# Patient Record
Sex: FEMALE | Race: WHITE | Employment: OTHER | URBAN - METROPOLITAN AREA
[De-identification: names, ages, dates, MRNs, and addresses within clinical notes are randomized per-mention and may not be internally consistent; named-entity substitution may affect disease eponyms.]

---

## 2017-02-27 ENCOUNTER — OFFICE VISIT (OUTPATIENT)
Dept: INTERNAL MEDICINE | Age: 82
End: 2017-02-27

## 2017-02-27 VITALS
TEMPERATURE: 97.3 F | RESPIRATION RATE: 12 BRPM | HEART RATE: 56 BPM | WEIGHT: 187 LBS | HEIGHT: 64 IN | OXYGEN SATURATION: 97 % | DIASTOLIC BLOOD PRESSURE: 100 MMHG | BODY MASS INDEX: 31.92 KG/M2 | SYSTOLIC BLOOD PRESSURE: 152 MMHG

## 2017-02-27 DIAGNOSIS — Z76.0 MEDICATION REFILL: ICD-10-CM

## 2017-02-27 DIAGNOSIS — F41.9 ANXIETY: ICD-10-CM

## 2017-02-27 DIAGNOSIS — R19.7 DIARRHEA, UNSPECIFIED TYPE: Primary | ICD-10-CM

## 2017-02-27 DIAGNOSIS — E03.9 ACQUIRED HYPOTHYROIDISM: ICD-10-CM

## 2017-02-27 DIAGNOSIS — I10 ESSENTIAL HYPERTENSION: ICD-10-CM

## 2017-02-27 DIAGNOSIS — M47.814 OSTEOARTHRITIS OF THORACIC SPINE, UNSPECIFIED SPINAL OSTEOARTHRITIS COMPLICATION STATUS: ICD-10-CM

## 2017-02-27 PROCEDURE — 99214 OFFICE O/P EST MOD 30 MIN: CPT | Performed by: FAMILY MEDICINE

## 2017-02-27 RX ORDER — METOPROLOL TARTRATE 100 MG/1
100 TABLET ORAL 2 TIMES DAILY
Qty: 180 TABLET | Refills: 3 | Status: ON HOLD | OUTPATIENT
Start: 2017-02-27 | End: 2022-05-08

## 2017-02-27 RX ORDER — DICYCLOMINE HCL 20 MG
20 TABLET ORAL 3 TIMES DAILY PRN
Qty: 90 TABLET | Refills: 2 | Status: ON HOLD | OUTPATIENT
Start: 2017-02-27 | End: 2019-12-13

## 2017-02-27 RX ORDER — OMEGA-3 FATTY ACIDS/FISH OIL 300-1000MG
1000 CAPSULE ORAL
COMMUNITY

## 2017-02-27 RX ORDER — TRAMADOL HYDROCHLORIDE 50 MG/1
50 TABLET ORAL EVERY 12 HOURS
Qty: 60 TABLET | Refills: 0 | Status: SHIPPED | OUTPATIENT
Start: 2017-02-27 | End: 2017-03-09

## 2017-02-27 RX ORDER — VITAMIN E 268 MG
400 CAPSULE ORAL DAILY
COMMUNITY

## 2017-02-27 RX ORDER — ACETAMINOPHEN 325 MG/1
650 TABLET ORAL EVERY 6 HOURS PRN
Status: ON HOLD | COMMUNITY
End: 2022-05-09 | Stop reason: SDUPTHER

## 2017-02-27 ASSESSMENT — PATIENT HEALTH QUESTIONNAIRE - PHQ9
SUM OF ALL RESPONSES TO PHQ9 QUESTIONS 1 & 2: 0
SUM OF ALL RESPONSES TO PHQ QUESTIONS 1-9: 0
1. LITTLE INTEREST OR PLEASURE IN DOING THINGS: 0
2. FEELING DOWN, DEPRESSED OR HOPELESS: 0

## 2019-12-13 ENCOUNTER — APPOINTMENT (OUTPATIENT)
Dept: GENERAL RADIOLOGY | Age: 84
DRG: 247 | End: 2019-12-13
Payer: MEDICARE

## 2019-12-13 ENCOUNTER — APPOINTMENT (OUTPATIENT)
Dept: CARDIAC CATH/INVASIVE PROCEDURES | Age: 84
DRG: 247 | End: 2019-12-13
Payer: MEDICARE

## 2019-12-13 ENCOUNTER — HOSPITAL ENCOUNTER (INPATIENT)
Age: 84
LOS: 1 days | Discharge: HOME | DRG: 247 | End: 2019-12-14
Attending: EMERGENCY MEDICINE | Admitting: INTERNAL MEDICINE
Payer: MEDICARE

## 2019-12-13 DIAGNOSIS — E87.6 HYPOKALEMIA: ICD-10-CM

## 2019-12-13 DIAGNOSIS — R07.9 CHEST PAIN, UNSPECIFIED TYPE: Primary | ICD-10-CM

## 2019-12-13 PROBLEM — I20.8 ANGINA AT REST (HCC): Status: ACTIVE | Noted: 2019-12-13

## 2019-12-13 PROBLEM — I21.4 NSTEMI (NON-ST ELEVATED MYOCARDIAL INFARCTION) (HCC): Status: ACTIVE | Noted: 2019-12-13

## 2019-12-13 LAB
ALBUMIN SERPL-MCNC: 4.1 G/DL (ref 3.5–4.6)
ALP BLD-CCNC: 68 U/L (ref 40–130)
ALT SERPL-CCNC: 20 U/L (ref 0–33)
ANION GAP SERPL CALCULATED.3IONS-SCNC: 11 MEQ/L (ref 9–15)
AST SERPL-CCNC: 28 U/L (ref 0–35)
BASOPHILS ABSOLUTE: 0 K/UL (ref 0–0.2)
BASOPHILS RELATIVE PERCENT: 0.2 %
BILIRUB SERPL-MCNC: 0.7 MG/DL (ref 0.2–0.7)
BUN BLDV-MCNC: 17 MG/DL (ref 8–23)
CALCIUM SERPL-MCNC: 9.8 MG/DL (ref 8.5–9.9)
CHLORIDE BLD-SCNC: 102 MEQ/L (ref 95–107)
CO2: 30 MEQ/L (ref 20–31)
CREAT SERPL-MCNC: 0.82 MG/DL (ref 0.5–0.9)
EOSINOPHILS ABSOLUTE: 0.2 K/UL (ref 0–0.7)
EOSINOPHILS RELATIVE PERCENT: 3.7 %
GFR AFRICAN AMERICAN: >60
GFR NON-AFRICAN AMERICAN: >60
GLOBULIN: 3.4 G/DL (ref 2.3–3.5)
GLUCOSE BLD-MCNC: 107 MG/DL (ref 70–99)
GLUCOSE BLD-MCNC: 142 MG/DL (ref 60–115)
HCT VFR BLD CALC: 40.9 % (ref 37–47)
HEMOGLOBIN: 14.1 G/DL (ref 12–16)
LV EF: 60 %
LVEF MODALITY: NORMAL
LYMPHOCYTES ABSOLUTE: 1.4 K/UL (ref 1–4.8)
LYMPHOCYTES RELATIVE PERCENT: 26.1 %
MCH RBC QN AUTO: 32.3 PG (ref 27–31.3)
MCHC RBC AUTO-ENTMCNC: 34.4 % (ref 33–37)
MCV RBC AUTO: 94.1 FL (ref 82–100)
MONOCYTES ABSOLUTE: 0.7 K/UL (ref 0.2–0.8)
MONOCYTES RELATIVE PERCENT: 13.6 %
NEUTROPHILS ABSOLUTE: 3.1 K/UL (ref 1.4–6.5)
NEUTROPHILS RELATIVE PERCENT: 56.4 %
PDW BLD-RTO: 13 % (ref 11.5–14.5)
PERFORMED ON: ABNORMAL
PLATELET # BLD: 126 K/UL (ref 130–400)
POTASSIUM SERPL-SCNC: 3.2 MEQ/L (ref 3.4–4.9)
PRO-BNP: 342 PG/ML
RBC # BLD: 4.35 M/UL (ref 4.2–5.4)
SODIUM BLD-SCNC: 143 MEQ/L (ref 135–144)
TOTAL PROTEIN: 7.5 G/DL (ref 6.3–8)
TROPONIN: 0.15 NG/ML (ref 0–0.01)
TROPONIN: 0.19 NG/ML (ref 0–0.01)
TROPONIN: <0.01 NG/ML (ref 0–0.01)
TSH SERPL DL<=0.05 MIU/L-ACNC: 1.76 UIU/ML (ref 0.44–3.86)
WBC # BLD: 5.5 K/UL (ref 4.8–10.8)

## 2019-12-13 PROCEDURE — 6360000002 HC RX W HCPCS: Performed by: PERSONAL EMERGENCY RESPONSE ATTENDANT

## 2019-12-13 PROCEDURE — 96374 THER/PROPH/DIAG INJ IV PUSH: CPT

## 2019-12-13 PROCEDURE — 6370000000 HC RX 637 (ALT 250 FOR IP): Performed by: PHYSICIAN ASSISTANT

## 2019-12-13 PROCEDURE — 99285 EMERGENCY DEPT VISIT HI MDM: CPT

## 2019-12-13 PROCEDURE — 2500000003 HC RX 250 WO HCPCS

## 2019-12-13 PROCEDURE — 93458 L HRT ARTERY/VENTRICLE ANGIO: CPT | Performed by: INTERNAL MEDICINE

## 2019-12-13 PROCEDURE — 2060000000 HC ICU INTERMEDIATE R&B

## 2019-12-13 PROCEDURE — 83880 ASSAY OF NATRIURETIC PEPTIDE: CPT

## 2019-12-13 PROCEDURE — C9600 PERC DRUG-EL COR STENT SING: HCPCS | Performed by: INTERNAL MEDICINE

## 2019-12-13 PROCEDURE — 93005 ELECTROCARDIOGRAM TRACING: CPT | Performed by: EMERGENCY MEDICINE

## 2019-12-13 PROCEDURE — 84443 ASSAY THYROID STIM HORMONE: CPT

## 2019-12-13 PROCEDURE — 74022 RADEX COMPL AQT ABD SERIES: CPT

## 2019-12-13 PROCEDURE — B2111ZZ FLUOROSCOPY OF MULTIPLE CORONARY ARTERIES USING LOW OSMOLAR CONTRAST: ICD-10-PCS | Performed by: INTERNAL MEDICINE

## 2019-12-13 PROCEDURE — 93010 ELECTROCARDIOGRAM REPORT: CPT | Performed by: INTERNAL MEDICINE

## 2019-12-13 PROCEDURE — 6360000004 HC RX CONTRAST MEDICATION: Performed by: INTERNAL MEDICINE

## 2019-12-13 PROCEDURE — 93005 ELECTROCARDIOGRAM TRACING: CPT | Performed by: INTERNAL MEDICINE

## 2019-12-13 PROCEDURE — 2580000003 HC RX 258: Performed by: INTERNAL MEDICINE

## 2019-12-13 PROCEDURE — 84484 ASSAY OF TROPONIN QUANT: CPT

## 2019-12-13 PROCEDURE — B2151ZZ FLUOROSCOPY OF LEFT HEART USING LOW OSMOLAR CONTRAST: ICD-10-PCS | Performed by: INTERNAL MEDICINE

## 2019-12-13 PROCEDURE — 80053 COMPREHEN METABOLIC PANEL: CPT

## 2019-12-13 PROCEDURE — C1894 INTRO/SHEATH, NON-LASER: HCPCS

## 2019-12-13 PROCEDURE — 93306 TTE W/DOPPLER COMPLETE: CPT

## 2019-12-13 PROCEDURE — 85025 COMPLETE CBC W/AUTO DIFF WBC: CPT

## 2019-12-13 PROCEDURE — 4A023N7 MEASUREMENT OF CARDIAC SAMPLING AND PRESSURE, LEFT HEART, PERCUTANEOUS APPROACH: ICD-10-PCS | Performed by: INTERNAL MEDICINE

## 2019-12-13 PROCEDURE — 027036Z DILATION OF CORONARY ARTERY, ONE ARTERY WITH THREE DRUG-ELUTING INTRALUMINAL DEVICES, PERCUTANEOUS APPROACH: ICD-10-PCS | Performed by: INTERNAL MEDICINE

## 2019-12-13 PROCEDURE — 6370000000 HC RX 637 (ALT 250 FOR IP): Performed by: INTERNAL MEDICINE

## 2019-12-13 PROCEDURE — 6360000002 HC RX W HCPCS

## 2019-12-13 PROCEDURE — APPNB45 APP NON BILLABLE 31-45 MINUTES: Performed by: PHYSICIAN ASSISTANT

## 2019-12-13 PROCEDURE — 6370000000 HC RX 637 (ALT 250 FOR IP): Performed by: PERSONAL EMERGENCY RESPONSE ATTENDANT

## 2019-12-13 PROCEDURE — 2580000003 HC RX 258

## 2019-12-13 PROCEDURE — 96375 TX/PRO/DX INJ NEW DRUG ADDON: CPT

## 2019-12-13 PROCEDURE — 2580000003 HC RX 258: Performed by: PHYSICIAN ASSISTANT

## 2019-12-13 PROCEDURE — 36415 COLL VENOUS BLD VENIPUNCTURE: CPT

## 2019-12-13 PROCEDURE — 2709999900 HC NON-CHARGEABLE SUPPLY

## 2019-12-13 PROCEDURE — 92928 PRQ TCAT PLMT NTRAC ST 1 LES: CPT | Performed by: INTERNAL MEDICINE

## 2019-12-13 PROCEDURE — 6370000000 HC RX 637 (ALT 250 FOR IP)

## 2019-12-13 PROCEDURE — C1887 CATHETER, GUIDING: HCPCS

## 2019-12-13 PROCEDURE — C1725 CATH, TRANSLUMIN NON-LASER: HCPCS

## 2019-12-13 PROCEDURE — C1769 GUIDE WIRE: HCPCS

## 2019-12-13 PROCEDURE — C1874 STENT, COATED/COV W/DEL SYS: HCPCS

## 2019-12-13 PROCEDURE — 6360000002 HC RX W HCPCS: Performed by: INTERNAL MEDICINE

## 2019-12-13 RX ORDER — ONDANSETRON 2 MG/ML
4 INJECTION INTRAMUSCULAR; INTRAVENOUS ONCE
Status: COMPLETED | OUTPATIENT
Start: 2019-12-13 | End: 2019-12-13

## 2019-12-13 RX ORDER — SODIUM CHLORIDE 0.9 % (FLUSH) 0.9 %
10 SYRINGE (ML) INJECTION PRN
Status: DISCONTINUED | OUTPATIENT
Start: 2019-12-13 | End: 2019-12-14 | Stop reason: HOSPADM

## 2019-12-13 RX ORDER — MORPHINE SULFATE 2 MG/ML
2 INJECTION, SOLUTION INTRAMUSCULAR; INTRAVENOUS ONCE
Status: COMPLETED | OUTPATIENT
Start: 2019-12-13 | End: 2019-12-13

## 2019-12-13 RX ORDER — ATORVASTATIN CALCIUM 40 MG/1
40 TABLET, FILM COATED ORAL NIGHTLY
Status: DISCONTINUED | OUTPATIENT
Start: 2019-12-13 | End: 2019-12-14 | Stop reason: HOSPADM

## 2019-12-13 RX ORDER — LOSARTAN POTASSIUM 25 MG/1
25 TABLET ORAL DAILY
Status: DISCONTINUED | OUTPATIENT
Start: 2019-12-13 | End: 2019-12-14 | Stop reason: HOSPADM

## 2019-12-13 RX ORDER — LABETALOL 20 MG/4 ML (5 MG/ML) INTRAVENOUS SYRINGE
10 EVERY 30 MIN PRN
Status: DISCONTINUED | OUTPATIENT
Start: 2019-12-13 | End: 2019-12-14 | Stop reason: HOSPADM

## 2019-12-13 RX ORDER — POTASSIUM CHLORIDE 20 MEQ/1
20 TABLET, EXTENDED RELEASE ORAL ONCE
Status: COMPLETED | OUTPATIENT
Start: 2019-12-13 | End: 2019-12-13

## 2019-12-13 RX ORDER — SODIUM CHLORIDE 0.9 % (FLUSH) 0.9 %
10 SYRINGE (ML) INJECTION EVERY 12 HOURS SCHEDULED
Status: DISCONTINUED | OUTPATIENT
Start: 2019-12-13 | End: 2019-12-14 | Stop reason: HOSPADM

## 2019-12-13 RX ORDER — HYDRALAZINE HYDROCHLORIDE 20 MG/ML
10 INJECTION INTRAMUSCULAR; INTRAVENOUS EVERY 10 MIN PRN
Status: DISCONTINUED | OUTPATIENT
Start: 2019-12-13 | End: 2019-12-14 | Stop reason: HOSPADM

## 2019-12-13 RX ORDER — ONDANSETRON 2 MG/ML
4 INJECTION INTRAMUSCULAR; INTRAVENOUS EVERY 6 HOURS PRN
Status: DISCONTINUED | OUTPATIENT
Start: 2019-12-13 | End: 2019-12-13 | Stop reason: SDUPTHER

## 2019-12-13 RX ORDER — ACETAMINOPHEN 325 MG/1
650 TABLET ORAL EVERY 6 HOURS PRN
Status: DISCONTINUED | OUTPATIENT
Start: 2019-12-13 | End: 2019-12-14 | Stop reason: HOSPADM

## 2019-12-13 RX ORDER — CARVEDILOL 3.12 MG/1
3.12 TABLET ORAL 2 TIMES DAILY
Status: DISCONTINUED | OUTPATIENT
Start: 2019-12-13 | End: 2019-12-14 | Stop reason: HOSPADM

## 2019-12-13 RX ORDER — POTASSIUM CHLORIDE 20 MEQ/1
40 TABLET, EXTENDED RELEASE ORAL ONCE
Status: DISCONTINUED | OUTPATIENT
Start: 2019-12-13 | End: 2019-12-13

## 2019-12-13 RX ORDER — SODIUM CHLORIDE 9 MG/ML
INJECTION, SOLUTION INTRAVENOUS CONTINUOUS
Status: DISCONTINUED | OUTPATIENT
Start: 2019-12-13 | End: 2019-12-14 | Stop reason: HOSPADM

## 2019-12-13 RX ORDER — ASPIRIN 81 MG/1
81 TABLET, CHEWABLE ORAL DAILY
Status: DISCONTINUED | OUTPATIENT
Start: 2019-12-14 | End: 2019-12-14 | Stop reason: HOSPADM

## 2019-12-13 RX ORDER — ACETAMINOPHEN 325 MG/1
650 TABLET ORAL EVERY 4 HOURS PRN
Status: DISCONTINUED | OUTPATIENT
Start: 2019-12-13 | End: 2019-12-14 | Stop reason: HOSPADM

## 2019-12-13 RX ORDER — ONDANSETRON 2 MG/ML
4 INJECTION INTRAMUSCULAR; INTRAVENOUS EVERY 6 HOURS PRN
Status: DISCONTINUED | OUTPATIENT
Start: 2019-12-13 | End: 2019-12-14 | Stop reason: HOSPADM

## 2019-12-13 RX ORDER — VITAMIN E 268 MG
400 CAPSULE ORAL DAILY
Status: DISCONTINUED | OUTPATIENT
Start: 2019-12-13 | End: 2019-12-14 | Stop reason: HOSPADM

## 2019-12-13 RX ORDER — SODIUM CHLORIDE 9 MG/ML
INJECTION, SOLUTION INTRAVENOUS CONTINUOUS
Status: ACTIVE | OUTPATIENT
Start: 2019-12-13 | End: 2019-12-14

## 2019-12-13 RX ADMIN — CARVEDILOL 3.12 MG: 3.12 TABLET, FILM COATED ORAL at 20:14

## 2019-12-13 RX ADMIN — Medication 10 ML: at 20:15

## 2019-12-13 RX ADMIN — MORPHINE SULFATE 2 MG: 2 INJECTION, SOLUTION INTRAMUSCULAR; INTRAVENOUS at 05:31

## 2019-12-13 RX ADMIN — ENOXAPARIN SODIUM 40 MG: 40 INJECTION SUBCUTANEOUS at 20:14

## 2019-12-13 RX ADMIN — LOSARTAN POTASSIUM 25 MG: 25 TABLET ORAL at 12:14

## 2019-12-13 RX ADMIN — SODIUM CHLORIDE: 9 INJECTION, SOLUTION INTRAVENOUS at 22:35

## 2019-12-13 RX ADMIN — SODIUM CHLORIDE: 9 INJECTION, SOLUTION INTRAVENOUS at 13:31

## 2019-12-13 RX ADMIN — POTASSIUM CHLORIDE 20 MEQ: 20 TABLET, EXTENDED RELEASE ORAL at 05:39

## 2019-12-13 RX ADMIN — IOPAMIDOL 180 ML: 612 INJECTION, SOLUTION INTRAVENOUS at 14:30

## 2019-12-13 RX ADMIN — LIDOCAINE HYDROCHLORIDE: 20 SOLUTION ORAL; TOPICAL at 05:05

## 2019-12-13 RX ADMIN — TICAGRELOR 90 MG: 90 TABLET ORAL at 20:14

## 2019-12-13 RX ADMIN — ONDANSETRON 4 MG: 2 INJECTION INTRAMUSCULAR; INTRAVENOUS at 05:30

## 2019-12-13 RX ADMIN — ATORVASTATIN CALCIUM 40 MG: 40 TABLET, FILM COATED ORAL at 20:14

## 2019-12-13 RX ADMIN — Medication 10 ML: at 10:46

## 2019-12-13 ASSESSMENT — ENCOUNTER SYMPTOMS
VOMITING: 0
DIARRHEA: 0
COLOR CHANGE: 0
SHORTNESS OF BREATH: 0
NAUSEA: 1
SORE THROAT: 0
ABDOMINAL PAIN: 1
BLOOD IN STOOL: 0
CHEST TIGHTNESS: 1
RHINORRHEA: 0
BACK PAIN: 1
COUGH: 0

## 2019-12-13 ASSESSMENT — PAIN DESCRIPTION - FREQUENCY: FREQUENCY: CONTINUOUS

## 2019-12-13 ASSESSMENT — HEART SCORE: ECG: 0

## 2019-12-13 ASSESSMENT — PAIN DESCRIPTION - DESCRIPTORS: DESCRIPTORS: ACHING

## 2019-12-13 ASSESSMENT — PAIN SCALES - GENERAL
PAINLEVEL_OUTOF10: 0
PAINLEVEL_OUTOF10: 0
PAINLEVEL_OUTOF10: 10
PAINLEVEL_OUTOF10: 8
PAINLEVEL_OUTOF10: 0

## 2019-12-13 ASSESSMENT — PAIN DESCRIPTION - PAIN TYPE: TYPE: ACUTE PAIN

## 2019-12-13 ASSESSMENT — PAIN DESCRIPTION - LOCATION: LOCATION: CHEST

## 2019-12-13 ASSESSMENT — PAIN DESCRIPTION - ONSET: ONSET: PROGRESSIVE

## 2019-12-13 ASSESSMENT — PAIN DESCRIPTION - PROGRESSION: CLINICAL_PROGRESSION: RAPIDLY IMPROVING

## 2019-12-14 VITALS
RESPIRATION RATE: 17 BRPM | OXYGEN SATURATION: 99 % | BODY MASS INDEX: 31.65 KG/M2 | SYSTOLIC BLOOD PRESSURE: 155 MMHG | WEIGHT: 190 LBS | HEIGHT: 65 IN | DIASTOLIC BLOOD PRESSURE: 64 MMHG | TEMPERATURE: 97.3 F | HEART RATE: 55 BPM

## 2019-12-14 LAB
ANION GAP SERPL CALCULATED.3IONS-SCNC: 12 MEQ/L (ref 9–15)
BUN BLDV-MCNC: 10 MG/DL (ref 8–23)
CALCIUM SERPL-MCNC: 9.1 MG/DL (ref 8.5–9.9)
CHLORIDE BLD-SCNC: 106 MEQ/L (ref 95–107)
CO2: 25 MEQ/L (ref 20–31)
CREAT SERPL-MCNC: 0.66 MG/DL (ref 0.5–0.9)
EKG ATRIAL RATE: 57 BPM
EKG ATRIAL RATE: 57 BPM
EKG P AXIS: 36 DEGREES
EKG P-R INTERVAL: 146 MS
EKG Q-T INTERVAL: 492 MS
EKG Q-T INTERVAL: 532 MS
EKG QRS DURATION: 110 MS
EKG QRS DURATION: 110 MS
EKG QTC CALCULATION (BAZETT): 478 MS
EKG QTC CALCULATION (BAZETT): 522 MS
EKG R AXIS: -2 DEGREES
EKG R AXIS: -3 DEGREES
EKG T AXIS: 45 DEGREES
EKG T AXIS: 81 DEGREES
EKG VENTRICULAR RATE: 57 BPM
EKG VENTRICULAR RATE: 58 BPM
GFR AFRICAN AMERICAN: >60
GFR NON-AFRICAN AMERICAN: >60
GLUCOSE BLD-MCNC: 101 MG/DL (ref 70–99)
HCT VFR BLD CALC: 37.8 % (ref 37–47)
HEMOGLOBIN: 13 G/DL (ref 12–16)
MCH RBC QN AUTO: 32.9 PG (ref 27–31.3)
MCHC RBC AUTO-ENTMCNC: 34.3 % (ref 33–37)
MCV RBC AUTO: 95.9 FL (ref 82–100)
PDW BLD-RTO: 12.9 % (ref 11.5–14.5)
PLATELET # BLD: 127 K/UL (ref 130–400)
POTASSIUM SERPL-SCNC: 3.6 MEQ/L (ref 3.4–4.9)
RBC # BLD: 3.94 M/UL (ref 4.2–5.4)
SODIUM BLD-SCNC: 143 MEQ/L (ref 135–144)
WBC # BLD: 5.3 K/UL (ref 4.8–10.8)

## 2019-12-14 PROCEDURE — 93005 ELECTROCARDIOGRAM TRACING: CPT | Performed by: INTERNAL MEDICINE

## 2019-12-14 PROCEDURE — 99238 HOSP IP/OBS DSCHRG MGMT 30/<: CPT | Performed by: INTERNAL MEDICINE

## 2019-12-14 PROCEDURE — 6360000002 HC RX W HCPCS: Performed by: INTERNAL MEDICINE

## 2019-12-14 PROCEDURE — 6370000000 HC RX 637 (ALT 250 FOR IP): Performed by: INTERNAL MEDICINE

## 2019-12-14 PROCEDURE — 85027 COMPLETE CBC AUTOMATED: CPT

## 2019-12-14 PROCEDURE — 6370000000 HC RX 637 (ALT 250 FOR IP): Performed by: PHYSICIAN ASSISTANT

## 2019-12-14 PROCEDURE — 90686 IIV4 VACC NO PRSV 0.5 ML IM: CPT | Performed by: INTERNAL MEDICINE

## 2019-12-14 PROCEDURE — G0008 ADMIN INFLUENZA VIRUS VAC: HCPCS | Performed by: INTERNAL MEDICINE

## 2019-12-14 PROCEDURE — 80048 BASIC METABOLIC PNL TOTAL CA: CPT

## 2019-12-14 PROCEDURE — 36415 COLL VENOUS BLD VENIPUNCTURE: CPT

## 2019-12-14 RX ORDER — CARVEDILOL 3.12 MG/1
3.12 TABLET ORAL 2 TIMES DAILY
Qty: 60 TABLET | Refills: 3 | Status: CANCELLED | OUTPATIENT
Start: 2019-12-14

## 2019-12-14 RX ORDER — LOSARTAN POTASSIUM 25 MG/1
25 TABLET ORAL DAILY
Qty: 30 TABLET | Refills: 3 | Status: ON HOLD | OUTPATIENT
Start: 2019-12-14 | End: 2022-05-08

## 2019-12-14 RX ORDER — ATORVASTATIN CALCIUM 40 MG/1
40 TABLET, FILM COATED ORAL NIGHTLY
Qty: 30 TABLET | Refills: 3 | Status: SHIPPED | OUTPATIENT
Start: 2019-12-14 | End: 2019-12-14 | Stop reason: HOSPADM

## 2019-12-14 RX ORDER — ATORVASTATIN CALCIUM 20 MG/1
20 TABLET, FILM COATED ORAL NIGHTLY
Refills: 3 | COMMUNITY
Start: 2019-12-08

## 2019-12-14 RX ORDER — MECLIZINE HYDROCHLORIDE 25 MG/1
25 TABLET ORAL 3 TIMES DAILY PRN
Refills: 2 | COMMUNITY
Start: 2019-11-22

## 2019-12-14 RX ORDER — OXYMETAZOLINE HYDROCHLORIDE 0.05 G/100ML
2 SPRAY NASAL 2 TIMES DAILY PRN
Status: DISCONTINUED | OUTPATIENT
Start: 2019-12-14 | End: 2019-12-14 | Stop reason: HOSPADM

## 2019-12-14 RX ORDER — HYDROCHLOROTHIAZIDE 25 MG/1
25 TABLET ORAL DAILY
Refills: 3 | COMMUNITY
Start: 2019-10-29

## 2019-12-14 RX ORDER — BUSPIRONE HYDROCHLORIDE 10 MG/1
10 TABLET ORAL 2 TIMES DAILY
Refills: 3 | Status: ON HOLD | COMMUNITY
Start: 2019-10-29 | End: 2022-05-08

## 2019-12-14 RX ORDER — MORPHINE SULFATE 2 MG/ML
2 INJECTION, SOLUTION INTRAMUSCULAR; INTRAVENOUS ONCE
Status: COMPLETED | OUTPATIENT
Start: 2019-12-14 | End: 2019-12-14

## 2019-12-14 RX ORDER — ZOLPIDEM TARTRATE 5 MG/1
5 TABLET ORAL NIGHTLY PRN
Refills: 2 | COMMUNITY
Start: 2019-12-10

## 2019-12-14 RX ADMIN — ASPIRIN 81 MG 81 MG: 81 TABLET ORAL at 08:10

## 2019-12-14 RX ADMIN — MORPHINE SULFATE 2 MG: 2 INJECTION, SOLUTION INTRAMUSCULAR; INTRAVENOUS at 03:30

## 2019-12-14 RX ADMIN — CARVEDILOL 3.12 MG: 3.12 TABLET, FILM COATED ORAL at 08:10

## 2019-12-14 RX ADMIN — TICAGRELOR 90 MG: 90 TABLET ORAL at 08:10

## 2019-12-14 RX ADMIN — ACETAMINOPHEN 650 MG: 325 TABLET ORAL at 00:51

## 2019-12-14 RX ADMIN — INFLUENZA A VIRUS A/BRISBANE/02/2018 IVR-190 (H1N1) ANTIGEN (PROPIOLACTONE INACTIVATED), INFLUENZA A VIRUS A/KANSAS/14/2017 X-327 (H3N2) ANTIGEN (PROPIOLACTONE INACTIVATED), INFLUENZA B VIRUS B/MARYLAND/15/2016 ANTIGEN (PROPIOLACTONE INACTIVATED), INFLUENZA B VIRUS B/PHUKET/3073/2013 BVR-1B ANTIGEN (PROPIOLACTONE INACTIVATED) 0.5 ML: 15; 15; 15; 15 INJECTION, SUSPENSION INTRAMUSCULAR at 11:07

## 2019-12-14 RX ADMIN — LOSARTAN POTASSIUM 25 MG: 25 TABLET ORAL at 08:10

## 2019-12-14 ASSESSMENT — PAIN SCALES - GENERAL
PAINLEVEL_OUTOF10: 0
PAINLEVEL_OUTOF10: 0
PAINLEVEL_OUTOF10: 7
PAINLEVEL_OUTOF10: 10
PAINLEVEL_OUTOF10: 0
PAINLEVEL_OUTOF10: 0

## 2019-12-14 ASSESSMENT — PAIN DESCRIPTION - DESCRIPTORS
DESCRIPTORS: DISCOMFORT;OTHER (COMMENT)
DESCRIPTORS: ACHING

## 2019-12-14 ASSESSMENT — PAIN DESCRIPTION - PROGRESSION: CLINICAL_PROGRESSION: NOT CHANGED

## 2019-12-14 ASSESSMENT — PAIN DESCRIPTION - PAIN TYPE
TYPE: ACUTE PAIN
TYPE: ACUTE PAIN

## 2019-12-14 ASSESSMENT — PAIN DESCRIPTION - ONSET: ONSET: ON-GOING

## 2019-12-14 ASSESSMENT — PAIN DESCRIPTION - LOCATION
LOCATION: GENERALIZED
LOCATION: GENERALIZED

## 2019-12-14 ASSESSMENT — PAIN DESCRIPTION - FREQUENCY
FREQUENCY: CONTINUOUS
FREQUENCY: CONTINUOUS

## 2019-12-16 PROCEDURE — 93010 ELECTROCARDIOGRAM REPORT: CPT | Performed by: INTERNAL MEDICINE

## 2022-05-07 ENCOUNTER — APPOINTMENT (OUTPATIENT)
Dept: GENERAL RADIOLOGY | Age: 87
DRG: 087 | End: 2022-05-07
Payer: MEDICARE

## 2022-05-07 ENCOUNTER — APPOINTMENT (OUTPATIENT)
Dept: CT IMAGING | Age: 87
DRG: 087 | End: 2022-05-07
Payer: MEDICARE

## 2022-05-07 ENCOUNTER — HOSPITAL ENCOUNTER (INPATIENT)
Age: 87
LOS: 2 days | Discharge: HOME | DRG: 087 | End: 2022-05-09
Attending: SURGERY | Admitting: SURGERY
Payer: MEDICARE

## 2022-05-07 DIAGNOSIS — G89.11 ACUTE PAIN DUE TO TRAUMA: ICD-10-CM

## 2022-05-07 DIAGNOSIS — S06.6X0A SUBARACHNOID HEMORRHAGE FOLLOWING INJURY, NO LOSS OF CONSCIOUSNESS, INITIAL ENCOUNTER (HCC): Primary | ICD-10-CM

## 2022-05-07 DIAGNOSIS — S02.2XXA CLOSED FRACTURE OF NASAL BONE, INITIAL ENCOUNTER: ICD-10-CM

## 2022-05-07 PROBLEM — I60.9 SAH (SUBARACHNOID HEMORRHAGE) (HCC): Status: ACTIVE | Noted: 2022-05-07

## 2022-05-07 LAB
ALBUMIN SERPL-MCNC: 4.3 G/DL (ref 3.5–4.6)
ALP BLD-CCNC: 73 U/L (ref 40–130)
ALT SERPL-CCNC: 14 U/L (ref 0–33)
ANION GAP SERPL CALCULATED.3IONS-SCNC: 11 MEQ/L (ref 9–15)
APTT: 31.7 SEC (ref 24.4–36.8)
AST SERPL-CCNC: 25 U/L (ref 0–35)
BASOPHILS ABSOLUTE: 0.1 K/UL (ref 0–0.2)
BASOPHILS RELATIVE PERCENT: 0.8 %
BILIRUB SERPL-MCNC: 0.5 MG/DL (ref 0.2–0.7)
BUN BLDV-MCNC: 17 MG/DL (ref 8–23)
CALCIUM SERPL-MCNC: 9.7 MG/DL (ref 8.5–9.9)
CHLORIDE BLD-SCNC: 104 MEQ/L (ref 95–107)
CO2: 26 MEQ/L (ref 20–31)
CREAT SERPL-MCNC: 1.08 MG/DL (ref 0.5–0.9)
EOSINOPHILS ABSOLUTE: 0.3 K/UL (ref 0–0.7)
EOSINOPHILS RELATIVE PERCENT: 4.4 %
ETHANOL PERCENT: NORMAL G/DL
ETHANOL: <10 MG/DL (ref 0–0.08)
GFR AFRICAN AMERICAN: 58
GFR NON-AFRICAN AMERICAN: 47.9
GLOBULIN: 2.8 G/DL (ref 2.3–3.5)
GLUCOSE BLD-MCNC: 115 MG/DL (ref 70–99)
HCT VFR BLD CALC: 36.9 % (ref 37–47)
HEMOGLOBIN: 12.4 G/DL (ref 12–16)
INR BLD: 1
LYMPHOCYTES ABSOLUTE: 1.4 K/UL (ref 1–4.8)
LYMPHOCYTES RELATIVE PERCENT: 20.4 %
MCH RBC QN AUTO: 32 PG (ref 27–31.3)
MCHC RBC AUTO-ENTMCNC: 33.6 % (ref 33–37)
MCV RBC AUTO: 95.4 FL (ref 82–100)
MONOCYTES ABSOLUTE: 0.9 K/UL (ref 0.2–0.8)
MONOCYTES RELATIVE PERCENT: 12.7 %
NEUTROPHILS ABSOLUTE: 4.2 K/UL (ref 1.4–6.5)
NEUTROPHILS RELATIVE PERCENT: 61.7 %
PDW BLD-RTO: 13.4 % (ref 11.5–14.5)
PLATELET # BLD: 129 K/UL (ref 130–400)
POTASSIUM SERPL-SCNC: 4.1 MEQ/L (ref 3.4–4.9)
PROTHROMBIN TIME: 13.5 SEC (ref 12.3–14.9)
RBC # BLD: 3.87 M/UL (ref 4.2–5.4)
SODIUM BLD-SCNC: 141 MEQ/L (ref 135–144)
TOTAL PROTEIN: 7.1 G/DL (ref 6.3–8)
WBC # BLD: 6.9 K/UL (ref 4.8–10.8)

## 2022-05-07 PROCEDURE — 70486 CT MAXILLOFACIAL W/O DYE: CPT

## 2022-05-07 PROCEDURE — 6370000000 HC RX 637 (ALT 250 FOR IP): Performed by: SURGERY

## 2022-05-07 PROCEDURE — 99223 1ST HOSP IP/OBS HIGH 75: CPT | Performed by: SURGERY

## 2022-05-07 PROCEDURE — 82077 ASSAY SPEC XCP UR&BREATH IA: CPT

## 2022-05-07 PROCEDURE — 96374 THER/PROPH/DIAG INJ IV PUSH: CPT

## 2022-05-07 PROCEDURE — 99221 1ST HOSP IP/OBS SF/LOW 40: CPT | Performed by: NEUROLOGICAL SURGERY

## 2022-05-07 PROCEDURE — 85610 PROTHROMBIN TIME: CPT

## 2022-05-07 PROCEDURE — 6360000002 HC RX W HCPCS: Performed by: PHYSICIAN ASSISTANT

## 2022-05-07 PROCEDURE — 80053 COMPREHEN METABOLIC PANEL: CPT

## 2022-05-07 PROCEDURE — 6360000002 HC RX W HCPCS: Performed by: SURGERY

## 2022-05-07 PROCEDURE — 36415 COLL VENOUS BLD VENIPUNCTURE: CPT

## 2022-05-07 PROCEDURE — 73130 X-RAY EXAM OF HAND: CPT

## 2022-05-07 PROCEDURE — 70450 CT HEAD/BRAIN W/O DYE: CPT

## 2022-05-07 PROCEDURE — 85025 COMPLETE CBC W/AUTO DIFF WBC: CPT

## 2022-05-07 PROCEDURE — 2000000000 HC ICU R&B

## 2022-05-07 PROCEDURE — 72128 CT CHEST SPINE W/O DYE: CPT

## 2022-05-07 PROCEDURE — 72125 CT NECK SPINE W/O DYE: CPT

## 2022-05-07 PROCEDURE — 99285 EMERGENCY DEPT VISIT HI MDM: CPT

## 2022-05-07 PROCEDURE — 2580000003 HC RX 258: Performed by: SURGERY

## 2022-05-07 PROCEDURE — 85730 THROMBOPLASTIN TIME PARTIAL: CPT

## 2022-05-07 RX ORDER — HYDROCHLOROTHIAZIDE 25 MG/1
25 TABLET ORAL DAILY
Status: DISCONTINUED | OUTPATIENT
Start: 2022-05-08 | End: 2022-05-09 | Stop reason: HOSPADM

## 2022-05-07 RX ORDER — OXYCODONE HYDROCHLORIDE 5 MG/1
5 TABLET ORAL ONCE
Status: DISCONTINUED | OUTPATIENT
Start: 2022-05-07 | End: 2022-05-07

## 2022-05-07 RX ORDER — MECLIZINE HYDROCHLORIDE 25 MG/1
25 TABLET ORAL 3 TIMES DAILY PRN
Status: DISCONTINUED | OUTPATIENT
Start: 2022-05-07 | End: 2022-05-09 | Stop reason: HOSPADM

## 2022-05-07 RX ORDER — SENNA AND DOCUSATE SODIUM 50; 8.6 MG/1; MG/1
1 TABLET, FILM COATED ORAL 2 TIMES DAILY
Status: DISCONTINUED | OUTPATIENT
Start: 2022-05-07 | End: 2022-05-09 | Stop reason: HOSPADM

## 2022-05-07 RX ORDER — MORPHINE SULFATE 2 MG/ML
2 INJECTION, SOLUTION INTRAMUSCULAR; INTRAVENOUS ONCE
Status: COMPLETED | OUTPATIENT
Start: 2022-05-07 | End: 2022-05-07

## 2022-05-07 RX ORDER — SODIUM CHLORIDE 9 MG/ML
INJECTION, SOLUTION INTRAVENOUS PRN
Status: DISCONTINUED | OUTPATIENT
Start: 2022-05-07 | End: 2022-05-09 | Stop reason: HOSPADM

## 2022-05-07 RX ORDER — BUSPIRONE HYDROCHLORIDE 10 MG/1
10 TABLET ORAL 2 TIMES DAILY
Status: DISCONTINUED | OUTPATIENT
Start: 2022-05-07 | End: 2022-05-09 | Stop reason: HOSPADM

## 2022-05-07 RX ORDER — SODIUM CHLORIDE 0.9 % (FLUSH) 0.9 %
5-40 SYRINGE (ML) INJECTION EVERY 12 HOURS SCHEDULED
Status: DISCONTINUED | OUTPATIENT
Start: 2022-05-07 | End: 2022-05-09 | Stop reason: HOSPADM

## 2022-05-07 RX ORDER — ONDANSETRON 4 MG/1
4 TABLET, ORALLY DISINTEGRATING ORAL EVERY 8 HOURS PRN
Status: DISCONTINUED | OUTPATIENT
Start: 2022-05-07 | End: 2022-05-09 | Stop reason: HOSPADM

## 2022-05-07 RX ORDER — ACETAMINOPHEN 325 MG/1
650 TABLET ORAL EVERY 6 HOURS
Status: DISCONTINUED | OUTPATIENT
Start: 2022-05-07 | End: 2022-05-09 | Stop reason: HOSPADM

## 2022-05-07 RX ORDER — ONDANSETRON 2 MG/ML
4 INJECTION INTRAMUSCULAR; INTRAVENOUS ONCE
Status: COMPLETED | OUTPATIENT
Start: 2022-05-07 | End: 2022-05-07

## 2022-05-07 RX ORDER — ATORVASTATIN CALCIUM 20 MG/1
20 TABLET, FILM COATED ORAL NIGHTLY
Status: DISCONTINUED | OUTPATIENT
Start: 2022-05-07 | End: 2022-05-09 | Stop reason: HOSPADM

## 2022-05-07 RX ORDER — ONDANSETRON 2 MG/ML
4 INJECTION INTRAMUSCULAR; INTRAVENOUS EVERY 6 HOURS PRN
Status: DISCONTINUED | OUTPATIENT
Start: 2022-05-07 | End: 2022-05-09 | Stop reason: HOSPADM

## 2022-05-07 RX ORDER — OXYCODONE HYDROCHLORIDE 5 MG/1
5 TABLET ORAL EVERY 4 HOURS PRN
Status: DISCONTINUED | OUTPATIENT
Start: 2022-05-07 | End: 2022-05-08

## 2022-05-07 RX ORDER — LOSARTAN POTASSIUM 25 MG/1
25 TABLET ORAL DAILY
Status: DISCONTINUED | OUTPATIENT
Start: 2022-05-08 | End: 2022-05-09 | Stop reason: HOSPADM

## 2022-05-07 RX ORDER — SODIUM CHLORIDE 0.9 % (FLUSH) 0.9 %
5-40 SYRINGE (ML) INJECTION PRN
Status: DISCONTINUED | OUTPATIENT
Start: 2022-05-07 | End: 2022-05-09 | Stop reason: HOSPADM

## 2022-05-07 RX ORDER — SODIUM CHLORIDE 9 MG/ML
INJECTION, SOLUTION INTRAVENOUS CONTINUOUS
Status: DISCONTINUED | OUTPATIENT
Start: 2022-05-07 | End: 2022-05-08

## 2022-05-07 RX ORDER — METOPROLOL TARTRATE 50 MG/1
100 TABLET, FILM COATED ORAL 2 TIMES DAILY
Status: DISCONTINUED | OUTPATIENT
Start: 2022-05-07 | End: 2022-05-09 | Stop reason: HOSPADM

## 2022-05-07 RX ORDER — OXYCODONE HYDROCHLORIDE 5 MG/1
10 TABLET ORAL EVERY 4 HOURS PRN
Status: DISCONTINUED | OUTPATIENT
Start: 2022-05-07 | End: 2022-05-08

## 2022-05-07 RX ORDER — HYDRALAZINE HYDROCHLORIDE 20 MG/ML
10 INJECTION INTRAMUSCULAR; INTRAVENOUS EVERY 4 HOURS PRN
Status: DISCONTINUED | OUTPATIENT
Start: 2022-05-07 | End: 2022-05-08

## 2022-05-07 RX ORDER — SODIUM PHOSPHATE, DIBASIC AND SODIUM PHOSPHATE, MONOBASIC 7; 19 G/133ML; G/133ML
1 ENEMA RECTAL DAILY PRN
Status: DISCONTINUED | OUTPATIENT
Start: 2022-05-07 | End: 2022-05-09 | Stop reason: HOSPADM

## 2022-05-07 RX ADMIN — METOPROLOL TARTRATE 100 MG: 50 TABLET, FILM COATED ORAL at 21:39

## 2022-05-07 RX ADMIN — LEVETIRACETAM 750 MG: 100 INJECTION, SOLUTION INTRAVENOUS at 21:49

## 2022-05-07 RX ADMIN — SODIUM CHLORIDE: 9 INJECTION, SOLUTION INTRAVENOUS at 21:48

## 2022-05-07 RX ADMIN — ATORVASTATIN CALCIUM 20 MG: 20 TABLET, FILM COATED ORAL at 21:40

## 2022-05-07 RX ADMIN — MORPHINE SULFATE 2 MG: 2 INJECTION, SOLUTION INTRAMUSCULAR; INTRAVENOUS at 19:54

## 2022-05-07 RX ADMIN — SODIUM CHLORIDE, PRESERVATIVE FREE 10 ML: 5 INJECTION INTRAVENOUS at 21:47

## 2022-05-07 RX ADMIN — BUSPIRONE HYDROCHLORIDE 10 MG: 10 TABLET ORAL at 21:40

## 2022-05-07 RX ADMIN — ONDANSETRON 4 MG: 2 INJECTION INTRAMUSCULAR; INTRAVENOUS at 18:55

## 2022-05-07 RX ADMIN — SENNOSIDES AND DOCUSATE SODIUM 1 TABLET: 50; 8.6 TABLET ORAL at 21:40

## 2022-05-07 RX ADMIN — ACETAMINOPHEN 650 MG: 325 TABLET ORAL at 21:39

## 2022-05-07 ASSESSMENT — PAIN SCALES - GENERAL
PAINLEVEL_OUTOF10: 7
PAINLEVEL_OUTOF10: 0
PAINLEVEL_OUTOF10: 8
PAINLEVEL_OUTOF10: 5
PAINLEVEL_OUTOF10: 0
PAINLEVEL_OUTOF10: 0
PAINLEVEL_OUTOF10: 5

## 2022-05-07 ASSESSMENT — PAIN DESCRIPTION - DESCRIPTORS
DESCRIPTORS: ACHING;DISCOMFORT
DESCRIPTORS: ACHING

## 2022-05-07 ASSESSMENT — PAIN DESCRIPTION - LOCATION
LOCATION: NECK;HEAD;FACE
LOCATION: HEAD

## 2022-05-07 ASSESSMENT — PAIN DESCRIPTION - FREQUENCY
FREQUENCY: CONTINUOUS
FREQUENCY: INTERMITTENT

## 2022-05-07 ASSESSMENT — ENCOUNTER SYMPTOMS
APNEA: 0
ABDOMINAL PAIN: 0
COLOR CHANGE: 0
TROUBLE SWALLOWING: 0
SHORTNESS OF BREATH: 0
ALLERGIC/IMMUNOLOGIC NEGATIVE: 1
EYE PAIN: 0

## 2022-05-07 ASSESSMENT — PAIN DESCRIPTION - PAIN TYPE
TYPE: ACUTE PAIN
TYPE: ACUTE PAIN

## 2022-05-07 ASSESSMENT — PAIN - FUNCTIONAL ASSESSMENT
PAIN_FUNCTIONAL_ASSESSMENT: PREVENTS OR INTERFERES SOME ACTIVE ACTIVITIES AND ADLS
PAIN_FUNCTIONAL_ASSESSMENT: PREVENTS OR INTERFERES SOME ACTIVE ACTIVITIES AND ADLS

## 2022-05-07 ASSESSMENT — PAIN DESCRIPTION - ONSET: ONSET: ON-GOING

## 2022-05-07 NOTE — ED PROVIDER NOTES
[address] [facility] ED  eMERGENCYdEPARTMENT eNCOUnter      Pt Name: Justo Sky  MRN: [id number]  [place] 1934  Date of evaluation: 5/7/2022  Provider:Braydon Walker PA-C    CHIEF COMPLAINT       Chief Complaint   Patient presents with    Fall     pt tripped over her dog and fell, denies LOC, c/o head and neck pain         HISTORY OF PRESENT ILLNESS  (Location/Symptom, Timing/Onset, Context/Setting, Quality, Duration, Modifying Factors, Severity.)   Justo Sky is a 80 y.o. female who presents to the emergency department following a mechanical fall at home prior to arrival.  Patient states that she had tripped over her dog, falling forward. Patient attempted to brace herself with her right hand and struck her head and face on the ground. She denies loss of consciousness. Patient states dizziness, headache and right hand pain as well as neck pain posteriorly. Patient denies any numbness or tingling. Patient denies any chest or abdominal pain. No change or loss of vision or hearing. HPI    Nursing Notes were reviewed and I agree. REVIEW OF SYSTEMS    (2-9 systems for level 4, 10 or more for level 5)     Review of Systems   Constitutional: Negative for diaphoresis and fever. HENT: Negative for hearing loss and trouble swallowing. Eyes: Negative for pain. Respiratory: Negative for apnea and shortness of breath. Cardiovascular: Negative for chest pain. Gastrointestinal: Negative for abdominal pain. Endocrine: Negative. Genitourinary: Negative for hematuria. Musculoskeletal: Positive for joint swelling. Negative for neck pain and neck stiffness. Skin: Positive for wound. Negative for color change. Allergic/Immunologic: Negative. Neurological: Positive for dizziness and headaches. Negative for numbness. Hematological: Negative. Psychiatric/Behavioral: Negative. All other systems reviewed and are negative.        Except as noted above the remainder of the review of systems was reviewed and negative. PAST MEDICAL HISTORY     Past Medical History:   Diagnosis Date    Anxiety     Cancer [facility])     colon s/p partial colectomy    GERD (gastroesophageal reflux disease)     Heart disease     Hyperlipidemia     Hypertension     Hypothyroidism     Obesity     Shortness of breath          SURGICAL HISTORY       Past Surgical History:   Procedure Laterality Date    BREAST SURGERY      COLECTOMY      partial for colon cancer    COLONOSCOPY      HYSTERECTOMY           CURRENT MEDICATIONS       Previous Medications    ACETAMINOPHEN (TYLENOL) 325 MG TABLET    Take 650 mg by mouth every 6 hours as needed for Pain    ASPIRIN (ECOTRIN) 325 MG EC TABLET    Take 81 mg by mouth daily    ASPIRIN 81 MG TABLET    Take 81 mg by mouth daily    ATORVASTATIN (LIPITOR) 20 MG TABLET    Take 20 mg by mouth nightly    BUSPIRONE (BUSPAR) 10 MG TABLET    Take 10 mg by mouth 2 times daily    HYDROCHLOROTHIAZIDE (HYDRODIURIL) 25 MG TABLET    Take 25 mg by mouth daily    LOSARTAN (COZAAR) 25 MG TABLET    Take 1 tablet by mouth daily    MECLIZINE (ANTIVERT) 25 MG TABLET    Take 25 mg by mouth 3 times daily as needed    METOPROLOL (LOPRESSOR) 100 MG TABLET    Take 1 tablet by mouth 2 times daily    OMEGA 3 1000 MG CAPS    Take 1,000 mg by mouth    TICAGRELOR (BRILINTA) 90 MG TABS TABLET    Take 1 tablet by mouth 2 times daily    VITAMIN E 400 UNIT CAPSULE    Take 400 Units by mouth daily    ZOLPIDEM (AMBIEN) 5 MG TABLET    Take 5 mg by mouth nightly as needed. ALLERGIES     Patient has no known allergies.     FAMILY HISTORY       Family History   Problem Relation Age of Onset    High Blood Pressure Mother     Other Mother         thyroid    Cancer Father     Heart Disease Brother           SOCIAL HISTORY       Social History     Socioeconomic History    Marital status:      Spouse name: None    Number of children: None    Years of education: None    Highest education level: None   Occupational History    None   Tobacco Use    Smoking status: Never Smoker    Smokeless tobacco: Never Used   Substance and Sexual Activity    Alcohol use: No    Drug use: No    Sexual activity: Never   Other Topics Concern    None   Social History Narrative    None     Social Determinants of Health     Financial Resource Strain:     Difficulty of Paying Living Expenses: Not on file   Food Insecurity:     Worried About Running Out of Food in the Last Year: Not on file    Jailyn of Food in the Last Year: Not on file   Transportation Needs:     Lack of Transportation (Medical): Not on file    Lack of Transportation (Non-Medical):  Not on file   Physical Activity:     Days of Exercise per Week: Not on file    Minutes of Exercise per Session: Not on file   Stress:     Feeling of Stress : Not on file   Social Connections:     Frequency of Communication with Friends and Family: Not on file    Frequency of Social Gatherings with Friends and Family: Not on file    Attends Zoroastrian Services: Not on file    Active Member of [address] [place] [address] or Organizations: Not on file    Attends Club or Organization Meetings: Not on file    Marital Status: Not on file   Intimate Partner Violence:     Fear of Current or Ex-Partner: Not on file    Emotionally Abused: Not on file    Physically Abused: Not on file    Sexually Abused: Not on file   Housing Stability:     Unable to Pay for Housing in the Last Year: Not on file    Number of [place] in the Last Year: Not on file    Unstable Housing in the Last Year: Not on file       SCREENINGS    [place] Coma Scale  Eye Opening: Spontaneous  Best Verbal Response: Oriented  Best Motor Response: Obeys commands  [place] Coma Scale Score: 15      PHYSICAL EXAM    (up to 7 forlevel 4, 8 or more for level 5)     ED Triage Vitals [05/07/22 1803]   BP Temp Temp Source Pulse Resp SpO2 Height Weight   (!) 181/93 98.5 °F (36.9 °C) Oral 90 20 97 % 5' 6\" (1.676 m) 160 lb (72.6 kg)       Physical Exam  Vitals and nursing note reviewed. Constitutional:       General: She is not in acute distress. Appearance: She is well-developed. She is not diaphoretic. HENT:      Head: Normocephalic. Contusion present. Jaw: No malocclusion. Nose: Signs of injury and laceration present. Mouth/Throat:      Pharynx: No oropharyngeal exudate. Eyes:      General: No scleral icterus. Conjunctiva/sclera: Conjunctivae normal.      Pupils: Pupils are equal, round, and reactive to light. Neck:      Trachea: No tracheal deviation. Cardiovascular:      Rate and Rhythm: Normal rate. Heart sounds: Normal heart sounds. Pulmonary:      Effort: Pulmonary effort is normal. No respiratory distress. Breath sounds: Normal breath sounds. Abdominal:      General: Bowel sounds are normal. There is no distension. Palpations: Abdomen is soft. Musculoskeletal:         General: Normal range of motion. Left hand: Swelling and tenderness present. Hands:       Cervical back: Normal range of motion and neck supple. Skin:     General: Skin is warm and dry. Findings: No erythema or rash. Neurological:      Mental Status: She is alert and oriented to person, place, and time. Cranial Nerves: No cranial nerve deficit. Motor: No abnormal muscle tone. Psychiatric:         Behavior: Behavior normal.         Thought Content:  Thought content normal.         Judgment: Judgment normal.           DIAGNOSTIC RESULTS     RADIOLOGY:   Non-plain film images such as CT, Ultrasound and MRI are read by the radiologist. Plain radiographic images are visualized and preliminarilyinterpreted by Zuri Pickens PA-C with the below findings:    Subarachnoid hemorrhage with trace subdural    Interpretation per the Radiologist below, if available at the time of this note:    CT HEAD WO CONTRAST   Final Result       Small areas at the right more than left convexity of subarachnoid    hemorrhage. Hemorrhage adjacent to the interhemispheric falx focally measuring around    5 mm for example axial 27 may represent subdural hemorrhage. Possible small focus of subarachnoid hemorrhage right parietal occipital    axial 22 and coronal 12. Area of posterior left temporal subarachnoid hemorrhage axial 14 and    coronal 18. Findings are concerning for possibility of small amount of hemorrhage    within the cerebral aqueduct for example sagittal 18. No hydrocephalus    or midline shift. CT MAXILLOFACIAL WO CONTRAST    (Results Pending)   CT CERVICAL SPINE WO CONTRAST    (Results Pending)   XR HAND RIGHT (MIN 3 VIEWS)    (Results Pending)   CT THORACIC SPINE WO CONTRAST    (Results Pending)       LABS:  Labs Reviewed   COMPREHENSIVE METABOLIC PANEL - Abnormal; Notable for the following components:       Result Value    Glucose 115 (*)     CREATININE 1.08 (*)     GFR Non- 47.9 (*)     GFR  58.0 (*)     All other components within normal limits   CBC WITH AUTO DIFFERENTIAL - Abnormal; Notable for the following components:    RBC 3.87 (*)     Hematocrit 36.9 (*)     MCH 32.0 (*)     Platelets 054 (*)     Monocytes Absolute 0.9 (*)     All other components within normal limits   PROTIME-INR   APTT   ETHANOL       All other labs were within normal range or not returnedas of this dictation. EMERGENCYDEPARTMENT COURSE and DIFFERENTIAL DIAGNOSIS/MDM:   Vitals:    Vitals:    05/07/22 1803 05/07/22 1900   BP: (!) 181/93 (!) 148/79   Pulse: 90 69   Resp: 20 18   Temp: 98.5 °F (36.9 °C)    TempSrc: Oral    SpO2: 97% 96%   Weight: 160 lb (72.6 kg)    Height: 5' 6\" (1.676 m)        REASSESSMENT        Patient presents emergency department after mechanical fall at home prior to arrival.  Patient struck her head and face as well as right hand.   CT scan does show traumatic subarachnoid hemorrhage with a trace subdural with no midline shift or mass-effect. The trauma was activated and saw the patient in the emergency department. This patient will be admitted for further evaluation and care    MDM    PROCEDURES:    Procedures      FINAL IMPRESSION      1. Subarachnoid hemorrhage following injury, no loss of consciousness, initial encounter [facility])          DISPOSITION/PLAN   DISPOSITION Decision To Admit 05/07/2022 07:14:42 PM      PATIENT REFERRED TO:  No follow-up provider specified.     DISCHARGE MEDICATIONS:  New Prescriptions    No medications on file       (Please note that portions of this note were completed with a voice recognition program.  Efforts were made to edit the dictations but occasionally words are mis-transcribed.)    DEMETRIA Horton PA-C  05/07/22 1920

## 2022-05-07 NOTE — ED NOTES
Bloods collected and sent to lab w/pt labels. Pt to CT scan/xray via cart.      Terese Frederick RN  05/07/22 Arturo Larose

## 2022-05-07 NOTE — H&P
Trauma Consult / H & P Note    Reason for Consult: Trauma  Consulting Provider: No att. providers found      BASIC INJURY INFORMATION:  Level of activation: CAT 2  Mode of transport: Personal vehicle  Mechanism of injury: Fall from Standing  Complicating features: NA  Protective measures: NA    HISTORY OF PRESENT INJURY:   Kobe Oliveira is a 80 y.o. female with a PMHx of CAD, HLD, HTN presents to the ED following a mechanical fall from standing. The patient tripped over a dog and fell onto her face and right hand. She denies any LOC. She is on ASA and plavix for CAD s/p PCI about three years ago. PRIMARY SURVEY:  Airway: Intact  Breathing: Normal   Breath Sounds: Breath Sounds Equal Bilaterally  Circulation:    Pulses: Normal   Skin: Normal skin color, texture and turgor  Disability:   Pupils: PERRL   GCS:    Best Eyes: 4    Best Verbal: 5    Best Motor: 6    Total: 15    Vitals:   Vitals:    05/07/22 1803 05/07/22 1900   BP: (!) 181/93 (!) 148/79   Pulse: 90 69   Resp: 20 18   Temp: 98.5 °F (36.9 °C)    TempSrc: Oral    SpO2: 97% 96%   Weight: 160 lb (72.6 kg)    Height: 5' 6\" (1.676 m)          SECONDARY SURVEY:  Neurologic: Alert and Oriented, Appropriate, Moves all Extremities, Strength Symmetrical and No Sensory Deficits   HEENT:   Head: No lacerations, bony step-offs, or abrasions and Midface stable to palpation   Eyes: Swelling/ecchymosis around the right eye, EOM intact   Ears: No Hemotympanum   Nose: Septum Midline, No crepitus with motion;    Throat: Oral cavity without trauma   Neck: No midline tenderness  Pulmonary: External exam: no crepitus or pain with palpation, no contusions or abrasions; and Lung exam: breath sounds clear, no wheezes, no rales  Cardiovascular:    Pulses: Bilateral radial, femoral, DP and PT pulses are normal;  Abdomen: Appearance: Non-distended, No scars, lacerations, contusions; and Palpation: no tenderness   Rectal: Not performed  Pelvis/Perineum: Pelvis is stable to palpation;  Musculoskeletal:    Back/Spine: Thoracolumbar spinal column non-tender; no step off or deformity; Extremities: Tender to palpation of the right hand, ulnar aspect    PAST MEDICAL HISTORY:  Past Medical History:   Diagnosis Date    Anxiety     Cancer [facility])     colon s/p partial colectomy    GERD (gastroesophageal reflux disease)     Heart disease     Hyperlipidemia     Hypertension     Hypothyroidism     Obesity     Shortness of breath        PAST SURGICAL HISTORY:  Past Surgical History:   Procedure Laterality Date    BREAST SURGERY      COLECTOMY      partial for colon cancer    COLONOSCOPY      HYSTERECTOMY         PRE-ADMISSION MEDICATIONS:   Prior to Admission medications    Medication Sig Start Date End Date Taking? Authorizing Provider   losartan (COZAAR) 25 MG tablet Take 1 tablet by mouth daily 12/14/19   Talia Pantoja MD   ticagrelor (BRILINTA) 90 MG TABS tablet Take 1 tablet by mouth 2 times daily 12/14/19   Talia Pantoja MD   atorvastatin (LIPITOR) 20 MG tablet Take 20 mg by mouth nightly 12/8/19   Historical Provider, MD   busPIRone (BUSPAR) 10 MG tablet Take 10 mg by mouth 2 times daily 10/29/19   Historical Provider, MD   hydrochlorothiazide (HYDRODIURIL) 25 MG tablet Take 25 mg by mouth daily 10/29/19   Historical Provider, MD   meclizine (ANTIVERT) 25 MG tablet Take 25 mg by mouth 3 times daily as needed 11/22/19   Historical Provider, MD   zolpidem (AMBIEN) 5 MG tablet Take 5 mg by mouth nightly as needed.  12/10/19   Historical Provider, MD   aspirin (ECOTRIN) 325 MG EC tablet Take 81 mg by mouth daily 12/7/09   Historical Provider, MD   Omega 3 1000 MG CAPS Take 1,000 mg by mouth    Historical Provider, MD   vitamin E 400 UNIT capsule Take 400 Units by mouth daily    Historical Provider, MD   acetaminophen (TYLENOL) 325 MG tablet Take 650 mg by mouth every 6 hours as needed for Pain    Historical Provider, MD   metoprolol (LOPRESSOR) 100 MG tablet Take 1 tablet by mouth 2 times daily 2/27/17   Rafiq Pardo MD   aspirin 81 MG tablet Take 81 mg by mouth daily    Historical Provider, MD       ALLERGIES:  Patient has no known allergies. SOCIAL HISTORY:   Social History     Socioeconomic History    Marital status:      Spouse name: None    Number of children: None    Years of education: None    Highest education level: None   Occupational History    None   Tobacco Use    Smoking status: Never Smoker    Smokeless tobacco: Never Used   Substance and Sexual Activity    Alcohol use: No    Drug use: No    Sexual activity: Never   Other Topics Concern    None   Social History Narrative    None     Social Determinants of Health     Financial Resource Strain:     Difficulty of Paying Living Expenses: Not on file   Food Insecurity:     Worried About Running Out of Food in the Last Year: Not on file    Jailyn of Food in the Last Year: Not on file   Transportation Needs:     Lack of Transportation (Medical): Not on file    Lack of Transportation (Non-Medical):  Not on file   Physical Activity:     Days of Exercise per Week: Not on file    Minutes of Exercise per Session: Not on file   Stress:     Feeling of Stress : Not on file   Social Connections:     Frequency of Communication with Friends and Family: Not on file    Frequency of Social Gatherings with Friends and Family: Not on file    Attends Cheondoism Services: Not on file    Active Member of [address] [place] [address] or Organizations: Not on file    Attends Club or Organization Meetings: Not on file    Marital Status: Not on file   Intimate Partner Violence:     Fear of Current or Ex-Partner: Not on file    Emotionally Abused: Not on file    Physically Abused: Not on file    Sexually Abused: Not on file   Housing Stability:     Unable to Pay for Housing in the Last Year: Not on file    Number of [place] in the Last Year: Not on file    Unstable Housing in the Last Year: Not on file       FAMILY HISTORY:  Family History   Problem Relation Age of Onset    High Blood Pressure Mother     Other Mother         thyroid    Cancer Father     Heart Disease Brother            REVIEW OF SYSTEMS:  Constitutional: Negative for weight loss  HENT: Negative for congestion, facial swelling and bloody nose  Eyes: Negative for vision changes  Respiratory: Negative for shortness of breath, difficulty breathing  Cardiovascular: Negative for chest wall pain. Gastrointestinal: Negative for abdominal distention, abdominal pain and vomiting. Genitourinary: Negative for hematuria  Musculoskeletal: Negative for gait difficulties   Skin: Negative for bruising, abrasions  Neurological: Negative for dizziness, weakness and light-headedness. Hematological: Negative for easy bruising/bleeding  Psychiatric/Behavioral: Negative for behavioral problems. Except as noted above the remainder of the review of systems was reviewed and negative.      BASIC LABS:   CBC with Differential:    Lab Results   Component Value Date    WBC 6.9 05/07/2022    RBC 3.87 05/07/2022    RBC 4.17 10/27/2011    HGB 12.4 05/07/2022    HCT 36.9 05/07/2022     05/07/2022    MCV 95.4 05/07/2022    MCH 32.0 05/07/2022    MCHC 33.6 05/07/2022    RDW 13.4 05/07/2022    LYMPHOPCT 20.4 05/07/2022    MONOPCT 12.7 05/07/2022    BASOPCT 0.8 05/07/2022    MONOSABS 0.9 05/07/2022    LYMPHSABS 1.4 05/07/2022    EOSABS 0.3 05/07/2022    BASOSABS 0.1 05/07/2022     CMP:   Lab Results   Component Value Date     05/07/2022    K 4.1 05/07/2022     05/07/2022    CO2 26 05/07/2022    BUN 17 05/07/2022    CREATININE 1.08 (H) 05/07/2022    GLUCOSE 115 (H) 05/07/2022    CALCIUM 9.7 05/07/2022    PROT 7.1 05/07/2022    LABALBU 4.3 05/07/2022    BILITOT 0.5 05/07/2022    ALKPHOS 73 05/07/2022    AST 25 05/07/2022    ALT 14 05/07/2022    LABGLOM 47.9 (L) 05/07/2022    GFRAA 58.0 (L) 05/07/2022    GLOB 2.8 05/07/2022     PT/INR:   Recent Labs     05/07/22  1815   PROTIME 13.5 INR 1.0     APTT:   Recent Labs     05/07/22 1815   APTT 31.7     EtOH:   Lab Results   Component Value Date    ETOH <10 05/07/2022       RADIOLOGY: (Personally reviewed)  CT Head:   Small areas at the right more than left convexity of subarachnoid    hemorrhage.         Hemorrhage adjacent to the interhemispheric falx focally measuring around    5 mm for example axial 27 may represent subdural hemorrhage.         Possible small focus of subarachnoid hemorrhage right parietal occipital    axial 22 and coronal 12.         Area of posterior left temporal subarachnoid hemorrhage axial 14 and    coronal 18.         Findings are concerning for possibility of small amount of hemorrhage    within the cerebral aqueduct for example sagittal 18.  No hydrocephalus    or midline shift. CT C-Spine: Negative    CT Thoracic: Negative    CT Face: right nasal fracture    XR hand right: Negative on my read      ASSESSMENT:  Fidel Faulkner is a 80 y.o. female presenting to the ED following a mechanical fall from standing.     Trauma workup found scattered SAH, right nasal fracture      PLAN:  Admit to trauma, ICU  Neurosurgery consulted  Holding DAPT  Will start keppra due to temporal lobe involvement  Appropriate home meds restarted  Repeat CT head in the morning, sooner if neurological change  Splint to right hand due to pain, no fracture identified  Spines clear, up with assist  Follow with ENT as an outpatient for right nasal bone fracture      Gauri Pierce MD  Trauma/Critical Care/General Surgery  [phone] (1E-2N)  [phone]

## 2022-05-07 NOTE — CONSULTS
Patient Name: Hao Nunez Date: 2022  6:05 PM  MR #: [id number]  : 1934    Attending Physician: No att. providers found  Reason for consult: Posttraumatic subarachnoid hemorrhage no loss of consciousness    History of Presenting Illness and Review of Jackson Margaret is a 80 y.o. female on hospital day 1. History Of Present Illness:  Malou Waterman is a 80 y.o. female who presents to the emergency department following a mechanical fall at home prior to arrival.  Patient states that she had tripped over her dog, falling forward. Patient attempted to brace herself with her right hand and struck her head and face on the ground. She denies loss of consciousness. Patient states dizziness, headache and right hand pain as well as neck pain posteriorly. Patient denies any numbness or tingling. Patient denies any chest or abdominal pain. No change or loss of vision or hearing.   Comorbidities of hyperlipidemia hypertension atherosclerotic cardiac disease GERD    History:      Past Medical History:   Diagnosis Date    Anxiety     Cancer ([address]     colon s/p partial colectomy    GERD (gastroesophageal reflux disease)     Heart disease     Hyperlipidemia     Hypertension     Hypothyroidism     Obesity     Shortness of breath      Past Surgical History:   Procedure Laterality Date    BREAST SURGERY      COLECTOMY      partial for colon cancer    COLONOSCOPY      HYSTERECTOMY         Family History  Family History   Problem Relation Age of Onset    High Blood Pressure Mother     Other Mother         thyroid    Cancer Father     Heart Disease Brother          Social History     Socioeconomic History    Marital status:      Spouse name: Not on file    Number of children: Not on file    Years of education: Not on file    Highest education level: Not on file   Occupational History    Not on file   Tobacco Use    Smoking status: Never Smoker    Smokeless tobacco: Never Used Substance and Sexual Activity    Alcohol use: No    Drug use: No    Sexual activity: Never   Other Topics Concern    Not on file   Social History Narrative    Not on file     Social Determinants of Health     Financial Resource Strain:     Difficulty of Paying Living Expenses: Not on file   Food Insecurity:     Worried About Running Out of Food in the Last Year: Not on file    Jailyn of Food in the Last Year: Not on file   Transportation Needs:     Lack of Transportation (Medical): Not on file    Lack of Transportation (Non-Medical): Not on file   Physical Activity:     Days of Exercise per Week: Not on file    Minutes of Exercise per Session: Not on file   Stress:     Feeling of Stress : Not on file   Social Connections:     Frequency of Communication with Friends and Family: Not on file    Frequency of Social Gatherings with Friends and Family: Not on file    Attends Jew Services: Not on file    Active Member of [address] [place] [address] or Organizations: Not on file    Attends Club or Organization Meetings: Not on file    Marital Status: Not on file   Intimate Partner Violence:     Fear of Current or Ex-Partner: Not on file    Emotionally Abused: Not on file    Physically Abused: Not on file    Sexually Abused: Not on file   Housing Stability:     Unable to Pay for Housing in the Last Year: Not on file    Number of [place] in the Last Year: Not on file    Unstable Housing in the Last Year: Not on file          Current Hospital Medications:     Scheduled Meds:  Continuous Infusions:  PRN Meds:. .       Allergies:     No Known Allergies       REVIEW OF SYSTEMS    (2-9 systems for level 4, 10 or more for level 5)     Constitutional: Negative for chills, diaphoresis, fatigue, fever. HENT: Negative for congestion, rhinorrhea, sore throat. Eyes: Negative for photophobia, blurred vision, diplopia, redness, visual change. Respiratory: Negative for cough and shortness of breath. Cardiovascular: Negative for chest pain and palpitations. Gastrointestinal: Negative for abdominal pain, diarrhea, nausea, vomiting. Genitourinary: Negative for dysuria, flank pain. Musculoskeletal: Negative, stiffness, swelling, decreased range of motion. Has some joint swelling  Integumentary (skin and /or breast):  Negative for rash, ulcer, mass, pruritus. Neurological: Dizziness and headaches  Endocrine: Negative for weight changes, sweating, heat intolerance, cold intolerance. Hematologic/Lymphatic: Negative for bleeding, anemia, easy bleeding, bruising. Allergic/Immunologic: Negative for swelling, itching, sneezing, anaphylaxis. Psychiatric/Behavioral: Negative for behavioral problems, confusion, hallucinations, mood changes. All other systems reviewed and are negative. Except as noted above the remainder of the review of systems was reviewed and negative. Physical Exam     Physical Exam:  Vitals and notes reviewed. Constitutional:  See above height, weight, pulse rate, and blood pressure. BMI      Appearance: Normal appearance. Head: Large right frontal subgaleal hematoma with contusion. Ecchymoses surrounding the right side of the face chin. Right eye partially swollen shut     Normocephalic and atraumatic. Ears, Nose, Mouth, and Throat:      Normal shape, no discharge, deglutition intact  Eyes:      Extraocular Movements: Extraocular movements intact. Pupils: Pupils are equal, round, and reactive to light. Cardiovascular:      Pulses: Normal radialis pulses. Heart sounds: Normal heart sounds, regular rate, no rubs or murmurs. Respiratory:      lungs clear to auscultation  Abdomen:        Soft to palpation. Bowel sounds present. Genitourinary:      Normal for sex  Musculoskeletal: Left hand swelling and tenderness     Gait: Patient not ambulated      General: Normal range of motion. Extremities well developed and symmetric.  Muscle tone normal with no spasticity or fasciculations. Skin:     General: Skin is warm and dry. Capillary Refill: Capillary refill less than 2 seconds. Neurological:      Mental Status: Alert and oriented to person, place, and time. Cranial nerves individually tested 2 through 12 normal  Hematologic/Lymphatic/Immunologic:      Negative for lymphadenopathy, hematomas. Psychiatric:         Mood and Affect: Mood normal. Appropriate affect    I have reviewed all laboratory studies, reports, data, and pertinent images. Objective Findings:     Vitals:   Vitals:    05/07/22 1803 05/07/22 1900   BP: (!) 181/93 (!) 148/79   Pulse: 90 69   Resp: 20 18   Temp: 98.5 °F (36.9 °C)    TempSrc: Oral    SpO2: 97% 96%   Weight: 160 lb (72.6 kg)    Height: 5' 6\" (1.676 m)         Laboratory, Microbiology, Pathology, Radiology, Cardiology, Medications and Transcriptions reviewed  Scheduled Meds:  Continuous Infusions:    Recent Labs     05/07/22  1815   WBC 6.9   HGB 12.4   HCT 36.9*   MCV 95.4   *     Recent Labs     05/07/22  1815      K 4.1      CO2 26   BUN 17   CREATININE 1.08*     Recent Labs     05/07/22  1815   AST 25   ALT 14   BILITOT 0.5   ALKPHOS 73     No results for input(s): LIPASE, AMYLASE in the last 72 hours. Recent Labs     05/07/22  1815   PROT 7.1   INR 1.0     CT HEAD WO CONTRAST    Result Date: 5/7/2022  CR Patient: Mckayla Varghese  Time Out: 19:15 Exam(s): CT HEAD Without Contrast  History:  Reason for exam: fall. Chief complaint fell, tripped over the dog, hit head, headache, upper neck and back pain  Exam: CT HEAD Without Contrast Technique more: All CT scan at this facility use dose modulation, iterative reconstruction, and/or weight based dosing when appropriate to reduce radiation dose to as low as reasonably achievable. Comparison:  FINDINGS:  Small areas at the right more than left convexity of subarachnoid hemorrhage.   Hemorrhage adjacent to the interhemispheric falx focally measuring around 5 mm for example axial 27 may represent subdural hemorrhage. Possible small focus of subarachnoid hemorrhage right parietal occipital axial 22 and coronal 12. Area of posterior left temporal subarachnoid hemorrhage axial 14 and coronal 18. Findings are concerning for possibility of small amount of hemorrhage within the cerebral aqueduct for example sagittal 18. No hydrocephalus or midline shift. Chronic and involutional changes. Right frontal and periorbital soft tissue swelling, hematoma. No calvarial fracture. Communications:  05/07/22 19:09 Call Doctor Regarding Intracranial Hemorrhage, called NIKOLAS Faye on 05/07 19:11 (-04:00)  Electronically signed by Cynthia Sky M.D. on 05-07-22 at [address] areas at the right more than left convexity of subarachnoid hemorrhage. Hemorrhage adjacent to the interhemispheric falx focally measuring around 5 mm for example axial 27 may represent subdural hemorrhage. Possible small focus of subarachnoid hemorrhage right parietal occipital axial 22 and coronal 12. Area of posterior left temporal subarachnoid hemorrhage axial 14 and coronal 18. Findings are concerning for possibility of small amount of hemorrhage within the cerebral aqueduct for example sagittal 18. No hydrocephalus or midline shift. CT scan head 5/8/2022 shows no change from prior CT scan. No increase in hemorrhage      Impression:   Fall with closed head injury subarachnoid hemorrhage no loss of consciousness. Stable CT scans of head. Comorbidities history of colon cancer hypertension atherosclerotic cardiac disease hypertension hyperlipidemia          Plan:     Repeat evaluations medical management serial CT scans of the head        Comments:     Neurosurgery will follow. CT scans of head have remained stable since hospitalization. Most likely patient will resolve these hemorrhages not require neurosurgical procedure.     Electronically signed by Hanna Britton MD on 5/7/2022 at 7:22 PM

## 2022-05-08 ENCOUNTER — APPOINTMENT (OUTPATIENT)
Dept: CT IMAGING | Age: 87
DRG: 087 | End: 2022-05-08
Payer: MEDICARE

## 2022-05-08 PROCEDURE — 70450 CT HEAD/BRAIN W/O DYE: CPT

## 2022-05-08 PROCEDURE — 99233 SBSQ HOSP IP/OBS HIGH 50: CPT | Performed by: PHYSICIAN ASSISTANT

## 2022-05-08 PROCEDURE — 6370000000 HC RX 637 (ALT 250 FOR IP): Performed by: SURGERY

## 2022-05-08 PROCEDURE — 6360000002 HC RX W HCPCS: Performed by: SURGERY

## 2022-05-08 PROCEDURE — 1210000000 HC MED SURG R&B

## 2022-05-08 PROCEDURE — 92523 SPEECH SOUND LANG COMPREHEN: CPT

## 2022-05-08 PROCEDURE — 2580000003 HC RX 258: Performed by: PHYSICIAN ASSISTANT

## 2022-05-08 PROCEDURE — 92610 EVALUATE SWALLOWING FUNCTION: CPT

## 2022-05-08 PROCEDURE — 2580000003 HC RX 258: Performed by: SURGERY

## 2022-05-08 PROCEDURE — 6360000002 HC RX W HCPCS: Performed by: PHYSICIAN ASSISTANT

## 2022-05-08 PROCEDURE — 6370000000 HC RX 637 (ALT 250 FOR IP): Performed by: PHYSICIAN ASSISTANT

## 2022-05-08 RX ORDER — OXYCODONE HYDROCHLORIDE 5 MG/1
2.5 TABLET ORAL EVERY 4 HOURS PRN
Status: DISCONTINUED | OUTPATIENT
Start: 2022-05-08 | End: 2022-05-09 | Stop reason: HOSPADM

## 2022-05-08 RX ORDER — OXYCODONE HYDROCHLORIDE 5 MG/1
5 TABLET ORAL EVERY 4 HOURS PRN
Status: DISCONTINUED | OUTPATIENT
Start: 2022-05-08 | End: 2022-05-09 | Stop reason: HOSPADM

## 2022-05-08 RX ORDER — BUSPIRONE HYDROCHLORIDE 15 MG/1
15 TABLET ORAL DAILY
COMMUNITY
Start: 2022-04-17

## 2022-05-08 RX ORDER — LOSARTAN POTASSIUM 50 MG/1
50 TABLET ORAL DAILY
COMMUNITY
Start: 2022-05-04

## 2022-05-08 RX ORDER — ACETAMINOPHEN 80 MG
TABLET,CHEWABLE ORAL ONCE
Status: DISCONTINUED | OUTPATIENT
Start: 2022-05-08 | End: 2022-05-09 | Stop reason: HOSPADM

## 2022-05-08 RX ORDER — NITROGLYCERIN 0.4 MG/1
0.4 TABLET SUBLINGUAL EVERY 5 MIN PRN
COMMUNITY
Start: 2020-03-10

## 2022-05-08 RX ORDER — LANOLIN ALCOHOL/MO/W.PET/CERES
3 CREAM (GRAM) TOPICAL NIGHTLY
COMMUNITY

## 2022-05-08 RX ORDER — METOPROLOL TARTRATE 50 MG/1
50 TABLET, FILM COATED ORAL 2 TIMES DAILY
COMMUNITY
Start: 2022-04-26

## 2022-05-08 RX ORDER — CLOPIDOGREL BISULFATE 75 MG/1
75 TABLET ORAL DAILY
Status: ON HOLD | COMMUNITY
Start: 2022-04-24 | End: 2022-05-08

## 2022-05-08 RX ADMIN — LOSARTAN POTASSIUM 25 MG: 25 TABLET, FILM COATED ORAL at 10:06

## 2022-05-08 RX ADMIN — ACETAMINOPHEN 650 MG: 325 TABLET ORAL at 10:06

## 2022-05-08 RX ADMIN — ACETAMINOPHEN 650 MG: 325 TABLET ORAL at 15:31

## 2022-05-08 RX ADMIN — ACETAMINOPHEN 650 MG: 325 TABLET ORAL at 21:00

## 2022-05-08 RX ADMIN — SENNOSIDES AND DOCUSATE SODIUM 1 TABLET: 50; 8.6 TABLET ORAL at 10:10

## 2022-05-08 RX ADMIN — OXYCODONE 10 MG: 5 TABLET ORAL at 01:51

## 2022-05-08 RX ADMIN — OXYCODONE 10 MG: 5 TABLET ORAL at 07:24

## 2022-05-08 RX ADMIN — BUSPIRONE HYDROCHLORIDE 10 MG: 10 TABLET ORAL at 07:25

## 2022-05-08 RX ADMIN — OXYCODONE 5 MG: 5 TABLET ORAL at 20:27

## 2022-05-08 RX ADMIN — BUSPIRONE HYDROCHLORIDE 10 MG: 10 TABLET ORAL at 20:25

## 2022-05-08 RX ADMIN — SODIUM CHLORIDE, PRESERVATIVE FREE 10 ML: 5 INJECTION INTRAVENOUS at 20:27

## 2022-05-08 RX ADMIN — OXYCODONE 5 MG: 5 TABLET ORAL at 13:15

## 2022-05-08 RX ADMIN — LEVETIRACETAM 750 MG: 100 INJECTION, SOLUTION INTRAVENOUS at 08:38

## 2022-05-08 RX ADMIN — HYDROCHLOROTHIAZIDE 25 MG: 25 TABLET ORAL at 10:07

## 2022-05-08 RX ADMIN — LEVETIRACETAM 750 MG: 100 INJECTION, SOLUTION INTRAVENOUS at 20:22

## 2022-05-08 RX ADMIN — ATORVASTATIN CALCIUM 20 MG: 20 TABLET, FILM COATED ORAL at 20:25

## 2022-05-08 RX ADMIN — SENNOSIDES AND DOCUSATE SODIUM 1 TABLET: 50; 8.6 TABLET ORAL at 20:26

## 2022-05-08 ASSESSMENT — PAIN SCALES - GENERAL
PAINLEVEL_OUTOF10: 0
PAINLEVEL_OUTOF10: 7
PAINLEVEL_OUTOF10: 10
PAINLEVEL_OUTOF10: 0
PAINLEVEL_OUTOF10: 7
PAINLEVEL_OUTOF10: 6
PAINLEVEL_OUTOF10: 9
PAINLEVEL_OUTOF10: 7
PAINLEVEL_OUTOF10: 9
PAINLEVEL_OUTOF10: 8
PAINLEVEL_OUTOF10: 5
PAINLEVEL_OUTOF10: 7
PAINLEVEL_OUTOF10: 0
PAINLEVEL_OUTOF10: 0
PAINLEVEL_OUTOF10: 6
PAINLEVEL_OUTOF10: 0

## 2022-05-08 ASSESSMENT — PAIN DESCRIPTION - FREQUENCY
FREQUENCY: CONTINUOUS
FREQUENCY: INTERMITTENT

## 2022-05-08 ASSESSMENT — PAIN DESCRIPTION - DESCRIPTORS
DESCRIPTORS: SORE;POUNDING
DESCRIPTORS: ACHING
DESCRIPTORS: ACHING;DISCOMFORT

## 2022-05-08 ASSESSMENT — PAIN DESCRIPTION - LOCATION
LOCATION: ARM;SHOULDER;NECK
LOCATION: GENERALIZED
LOCATION: HEAD;WRIST
LOCATION: HEAD;FINGER (COMMENT WHICH ONE)
LOCATION: FINGER (COMMENT WHICH ONE);HEAD
LOCATION: HEAD;WRIST
LOCATION: GENERALIZED
LOCATION: ARM

## 2022-05-08 ASSESSMENT — PAIN DESCRIPTION - ORIENTATION
ORIENTATION: RIGHT
ORIENTATION: LEFT;RIGHT;MID
ORIENTATION: RIGHT
ORIENTATION: RIGHT

## 2022-05-08 ASSESSMENT — PAIN DESCRIPTION - PAIN TYPE
TYPE: ACUTE PAIN

## 2022-05-08 ASSESSMENT — PAIN DESCRIPTION - ONSET
ONSET: AWAKENED FROM SLEEP
ONSET: ON-GOING

## 2022-05-08 NOTE — CARE COORDINATION
[facility] EMERGENCY [facility] CENTER AT [place] Case Management Initial Discharge Assessment    Met with Patient and Family to discuss discharge plan. PCP: Blanca Magallanes DO                                Date of Last Visit: 2-3 months    [facility] Patient: No        [facility] Notified: no    If no PCP, list provided? N/A    Discharge Planning    Living Arrangements: independently at home    Who do you live with? son    Who helps you with your care:  self or family    If lives at home:     Do you have any barriers navigating in your home? no    Patient can perform ADL? Yes    Current Services (outpatient and in home) :  None    Dialysis: No    Is transportation available to get to your appointments? Yes    DME Equipment:  no    Respiratory equipment: None    Respiratory provider:  no     Pharmacy:  [facility] - [facility]    Consult with Medication Assistance Program?  No      Patient agreeable to [facility]? Declined    Patient agreeable to SNF/Rehab? Declined    Other discharge needs identified? N/A    Does Patient Have a High-Risk for Readmission Diagnosis (CHF, PN, MI, COPD)? No      Initial Discharge Plan? (Note: please see concurrent daily documentation for any updates after initial note). Pt and family denied any d/c needs in ER. Cm to assess for any further d/c needs and referrals.     Readmission Risk              Risk of Unplanned Readmission:  0         Electronically signed by Yvette Aj RN on 5/7/2022 at 8:45 PM

## 2022-05-08 NOTE — PROGRESS NOTES
Spiritual Care Services     Summary of Visit:  Pt grieving the death of her  of 61 yrs,  in 2022. Cared for him at home with hospice support until his death. Became weary from the weight of care-giving for so long. Coping with present hospitalization okay. Pt has three sons, good support. Spiritual Assessment/Intervention/Outcomes:    Encounter Summary  Encounter Overview/Reason : Initial Encounter  Service Provided For[de-identified] Patient  Referral/Consult From[de-identified] Rounding  Support System: Children  Complexity of Encounter: Moderate  Begin Time: 915  End Time : 935  Total Time Calculated: 20 min  Encounter   Type: Initial Screen/Assessment     Spiritual/Emotional needs  Type: Spiritual Support     Grief, Loss, and Adjustments  Type: Grief and loss ( of 61 yrs  in January)              Values / Beliefs  Do You Have Any Ethnic, Cultural, Sacramental, or Spiritual Mosque Needs You Would Like Us To Be Aware of While You Are in the Hospital : No    Care Plan:    Grief support. Spiritual Care Services   Electronically signed by Juan Cason on 22 at 9:29 AM EDT     To reach a  for emotional and spiritual support, place an [facility] consult request.   If a  is needed immediately, dial 0 and ask to page the on-call .

## 2022-05-08 NOTE — FLOWSHEET NOTE
Shift summary    Report at bedside. Pt a&ox4, c/o head and wrist pain 10/10. Noted ecchymosis/swelling to R side of face. Neuro checks done, WDL. CHOUDHARY, follows commands. PERRL. LS clear on RA no SOB. abd s/nt bs +x4.  Continent B/B.

## 2022-05-08 NOTE — PROGRESS NOTES
20:30-21:00 Pt arrived to unit via stretcher accompanied by ED RN and transport. Pt safely transferred into ICU bed #5 w/o incident. All proper monitoring equiment applied and functioning. Bedside report received from ED, RN. Pt A&Ox4, able to make needs and wants known. Pt oriented to room and proper use of call light. Pt returned proper demonstration of use of call light. Skin assessment completed by this nurse and secondary nurse Martha Gomez). Skin assessment as follows: skin discoloration and slight swelling  noted to/around right eye red, dark blue/purple in color, abrasions noted to nose and LFA areas left KEILY, bruising noted throughout, bilateral heels intact. Preventive mepilex applied to bilateral heels and sacrum. Pt requested to keep bra and underwear on at this time. Brace remains in place to right wrist d/t pain, right wrist intact no injuries noted. All extremities warm to touch and Cap refill <3 seconds. No s/s of distress or bleeding noted at this time. Safety measures in place. 21:35-22:00 Bedside swallow screening completed pt passed and tolerated well. Pt took PO medications whole with thin liquids w/o difficulty. 01:00-01:55 Reassessment completed, no changes in assessment noted (see flowsheets). Pt tolerating IVF drip well. Cap refill <3 seconds to all extremities. Pt requested to use bedpan. Pt safely placed on and off bedpan and enio care provided. Pt's personal underwear removed and disposable underwear with pad applied per pts request.  Pt c/o generalized pain being 9/10. Reposition and distraction not effective in relieving pain, PRN pain medication given per pt request and MD order. No s/s of distress or bleeding noted. Safety measures in place. 05:00 Pt reassessed, no changes in assessment noted (see flowsheets). Pt refused scheduled tyl, pt stated it does nothing at all for my pain. No s/s of distress or bleeding noted. Safety measures in place.      05:40-06:15 Pt transported to and from CT by night shift RN supervisor and transport w/o incident. AM labs drawn by , pt tolerated well.     07:00 Bedside handoff report given to on coming RN. Safety measures in place.

## 2022-05-08 NOTE — ACP (ADVANCE CARE PLANNING)
Advance Care Planning     Advance Care Planning Activator (Inpatient)  Conversation Note      Date of ACP Conversation: 5/7/2022     Conversation Conducted with: Patient with Decision Making Capacity    ACP Activator: Brant Hood RN  Health Care Decision Maker:     Current Designated Health Care Decision Maker:     Primary Decision Maker: Alberto Lara Child - [phone]    Secondary Decision Maker: Yoanna Press - [phone]     Pt states that she does have a living will and that it is current with her wishes. Care Preferences    Ventilation: \"If you were in your present state of health and suddenly became very ill and were unable to breathe on your own, what would your preference be about the use of a ventilator (breathing machine) if it were available to you? \"      Would the patient desire the use of ventilator (breathing machine)?: yes    \"If your health worsens and it becomes clear that your chance of recovery is unlikely, what would your preference be about the use of a ventilator (breathing machine) if it were available to you? \"     Would the patient desire the use of ventilator (breathing machine)?: No      Resuscitation  \"CPR works best to restart the heart when there is a sudden event, like a heart attack, in someone who is otherwise healthy. Unfortunately, CPR does not typically restart the heart for people who have serious health conditions or who are very sick. \"    \"In the event your heart stopped as a result of an underlying serious health condition, would you want attempts to be made to restart your heart (answer \"yes\" for attempt to resuscitate) or would you prefer a natural death (answer \"no\" for do not attempt to resuscitate)? \" yes       [x] Yes   [] No   Educated Patient / Sneha Willoughby regarding differences between Advance Directives and portable DNR orders.     Length of ACP Conversation in minutes:  10    Conversation Outcomes:  [x] ACP discussion completed  [] Existing advance directive reviewed with patient; no changes to patient's previously recorded wishes  [] New Advance Directive completed  [] Portable Do Not Rescitate prepared for Provider review and signature  [] POLST/POST/MOLST/MOST prepared for Provider review and signature      Follow-up plan:    [] Schedule follow-up conversation to continue planning  [] Referred individual to Provider for additional questions/concerns   [] Advised patient/agent/surrogate to review completed ACP document and update if needed with changes in condition, patient preferences or care setting    [x] This note routed to one or more involved healthcare providers

## 2022-05-08 NOTE — PROGRESS NOTES
TRAUMA DAILY PROGRESS NOTE      Patient Name: Amrik Segovia  Admission Date 2022    Hospital Day: 1  Patient seen and examined on 2022    INTERVAL HISTORY/EVENTS     Background: Amrik Segovia is a 80 y.o. female with a PMHx of colon CA, HTN, CAD (ASA and Plavix for CAD s/p PCI 3 years ago), HTN, HLD presented to [facility] s/p mechanical trip and fall over her dog while at home on . (+) head strike, (-) LOC, (+) ASA and Plavix. Trauma workup found scattered SAH and right nasal fracture. She was subsequently admitted to our trauma service with NSGY on consult. Hospital Course:  2022: Admitted to trauma for [facility] s/p mechanical fall from standing over dog at home on . (+) head strike, (-) LOC, (+) ASA and Plavix. NSGY consulted. 24 Hour Events:  Admitted to our service overnight s/p mechanical fall from standing. Neruo exam stable since arrival to ICU. Repeat CT Head this morning with stable SAH. Ok for transfer out of ICU. No new concerns from patient this am. No new labs this am.      PHYSICAL EXAM     Vitals Average, Min and Max for last 24 hours:  Temp: Temp: 97.5 °F (36.4 °C); Temp  Av.9 °F (36.6 °C)  Min: 97.5 °F (36.4 °C)  Max: 98.5 °F (36.9 °C)  Respirations: Resp  Av.1  Min: 10  Max: 20  Pulse: Pulse  Av.8  Min: 48  Max: 90  Blood Pressure: Systolic (33MNS), ZYI:865 , Min:65 , ORB:703   ; Diastolic (30BXE), AGA:26, Min:30, Max:107  SpO2: SpO2  Av.8 %  Min: 91 %  Max: 100 %    24hr Intake/Output:   Intake/Output Summary (Last 24 hours) at 2022 0738  Last data filed at 2022 0600  Gross per 24 hour   Intake 97.56 ml   Output 300 ml   Net -202.44 ml       Vitals: /74   Pulse (!) 48   Temp 97.5 °F (36.4 °C) (Oral)   Resp 11   Ht 5' 6\" (1.676 m)   Wt 161 lb 6 oz (73.2 kg)   SpO2 100%   BMI 26.05 kg/m²     Physical Exam:  Constitutional: Lying supine in bed. Alert and conversant. HEENT: Nasal bone ecchymosis and swelling.  Laceration above right eyebrow, c/d/i. Cardiovascular: Regular rate and rhythm on telemetry. Pulmonary: Clear to ausculation bilaterally on room air. No wheezing, rhonchi or rales. Abdominal: Soft. Non-distended. Non-tender. Musculoskeletal: Good ROM in all extremities. No edema. Neurological: Alert, awake, and orientated x 3. Motor and sensory grossly intact. No focal deficits. GCS of 15. No focal deficits. LABORATORY RESULTS (LAST 24 HOURS)     CBC with Differential:    Lab Results   Component Value Date    WBC 6.9 05/07/2022    RBC 3.87 05/07/2022    RBC 4.17 10/27/2011    HGB 12.4 05/07/2022    HCT 36.9 05/07/2022     05/07/2022    MCV 95.4 05/07/2022    MCH 32.0 05/07/2022    MCHC 33.6 05/07/2022    RDW 13.4 05/07/2022    LYMPHOPCT 20.4 05/07/2022    MONOPCT 12.7 05/07/2022    BASOPCT 0.8 05/07/2022    MONOSABS 0.9 05/07/2022    LYMPHSABS 1.4 05/07/2022    EOSABS 0.3 05/07/2022    BASOSABS 0.1 05/07/2022     CMP:    Lab Results   Component Value Date     05/07/2022    K 4.1 05/07/2022     05/07/2022    CO2 26 05/07/2022    BUN 17 05/07/2022    CREATININE 1.08 05/07/2022    GFRAA 58.0 05/07/2022    LABGLOM 47.9 05/07/2022    GLUCOSE 115 05/07/2022    GLUCOSE 85 10/27/2011    PROT 7.1 05/07/2022    LABALBU 4.3 05/07/2022    CALCIUM 9.7 05/07/2022    BILITOT 0.5 05/07/2022    ALKPHOS 73 05/07/2022    AST 25 05/07/2022    ALT 14 05/07/2022     Magnesium:  No results found for: MG  PT/INR:    Lab Results   Component Value Date    PROTIME 13.5 05/07/2022    INR 1.0 05/07/2022     PTT:    Lab Results   Component Value Date    APTT 31.7 05/07/2022       IMAGING RESULTS (PERSONALLY REVIEWED)     All admission and follow up imaging reviewed. ASSESSMENT & PLAN     Diagnoses:  S/p mechanical fall from standing on 5/7  Scattered SAH    PMHx: colon CA, HTN, CAD (ASA and Plavix for CAD s/p PCI 3 years ago), HTN, HLD    Incidental Findings: None, JLR, 5/8/22.       ASSESSMENT/PLAN:  Neurological: Acute SAH s/p mechanical fall from standing.  - NSGY consulted. [address] stable. No acute NSGY intervention.  - Continue Keppra 750mg BID x 7 days (stop date 5/15)  - Tylenol 650mg q6hr scheduled  - Continue home Buspar 10mg BID     Cardiovascular: No acute cardiovascular issues. - Continue home HTCZ 25mg daily  - Continue home Lipitor 20mg nightly  - Continue home Cozaar 25mg daily  - Continue home Lopressor 100mg BID     Respiratory: No acute respiratory issues. - Maintain O2 sats > 92%  - Encourage IS 10x hourly     GI/Diet: No acute issues.  - Ok for regular diet. - Continue bowel regimen with Senokot BID,      Renal/Electrolytes: Cr on admission 1.08, given gentle IVF.  - HLIV  - BMP in am to monitor Cr. ID:   No active infection. Remains afebrile, normotensive, and without leukocytosis. - No indication for Abx     Heme: HDS. No acute issues.  - No indication for transfusion. Endocrine:  - No acute issues     MSK: S/p mechanical fall from standing on 5/7. - Splint to right hand due to pain, no fracture identified  - Spines: cleared  - Weight Bearing Restrictions: No restrictions  - Activity: As toerlated  - PT/OT: Eval and dispo rec's as able    Prophylaxis:   - SCDs only ofr VTE PPx, No chemo PPx given bleed risk. Ok for Chemo PPx 48hr s/p stable CT Head, this will be 5/10/22  - No GI PPx indicated    Lines/Tubes/Drains:  - Maintain PIVs  - No indication for bee catheter, monitor for urinary retention    Dispo: [id number] [address]eline Brown for transfer out of ICU to [facility] or [address] [place] [address].  Accom Status: General Status      - Follow up with NSGY (Dr. Roland Becerra) TBD.  - Follow with ENT as an outpatient for right nasal bone fracture as needed. - No need for trauma follow up. Please call for any questions or concerns.     Bib Calero, [address] [facility].  [phone] ([phone]-[phone])  [phone]     This patient's plan of care was discussed and made in collaboration with Trauma Attending physician, Maria Isabel Mistry MD.    Teaching Physician Note:  I saw and evaluated the patient. I personally obtained the key and critical portions of the history and physical exam.  I reviewed the ROHIT's documentation and discussed the patient with the ROHIT. I agree with the ROHIT's medical decision making as documented in the ROHIT note.       Temitope Joel MD

## 2022-05-08 NOTE — PROGRESS NOTES
[facility]   Facility/Department: [facility] [address] [place]  Speech Language Pathology  Clinical Bedside Swallow Evaluation    NAME:Kirsten Mackey  : 1934 (80 y.o.)   MRN: [id number]  ROOM: [place] [address]  ADMISSION DATE: 2022  PATIENT DIAGNOSIS(ES): SAH (subarachnoid hemorrhage) ([facility] [I60.9]  Subarachnoid hemorrhage following injury, no loss of consciousness, initial encounter [facility]) [S06.6X0A]  Chief Complaint   Patient presents with    Fall     pt tripped over her dog and fell, denies LOC, c/o head and neck pain     Patient Active Problem List    Diagnosis Date Noted    Subarachnoid hemorrhage following injury, no loss of consciousness ([facility] 2022    SAH (subarachnoid hemorrhage) ([facility] 2022    Chest pain 2019    Angina at rest [facility]) 2019    NSTEMI (non-ST elevated myocardial infarction) ([facility] 2019    Hypertension     Hyperlipidemia     GERD (gastroesophageal reflux disease)     Anxiety     Heart disease     Shortness of breath     Hypothyroidism     Obesity     Cancer ([facility]     Senile cataract 2012     Past Medical History:   Diagnosis Date    Anxiety     Cancer [facility])     colon s/p partial colectomy    GERD (gastroesophageal reflux disease)     Heart disease     Hyperlipidemia     Hypertension     Hypothyroidism     Obesity     Shortness of breath      Past Surgical History:   Procedure Laterality Date    BREAST SURGERY      COLECTOMY      partial for colon cancer    COLONOSCOPY      HYSTERECTOMY       No Known Allergies    DATE ONSET: 2022    Date of Evaluation: 2022   Evaluating Therapist: CRYSTAL Santos    Dysphagia Diagnosis  Dysphagia Diagnosis: Swallow function appears WFL  Dysphagia Impression : WFL oropharyngeal swallowing function    Recommended Diet     Diet Solids Recommendation: Regular  Liquid Consistency Recommendation:  Thin  Recommended Form of Meds: PO     Reason for Referral  Valery Holcomb was referred for a bedside swallow evaluation to assess the efficiency of her swallow function, identify signs and symptoms of aspiration, identify risk factors, and make recommendations regarding safe dietary consistencies, effective compensatory strategies, and safe eating environment. General  Chart Reviewed: Yes  Subjective  Subjective: Pt reports that it was difficult for her to eat yesterday secondary to swelling but she is able to eat \"much better\" today. Behavior/Cognition: Alert; Cooperative;Pleasant mood  Respiratory Status: Room air  O2 Device: None (Room air)  Communication Observation: Functional  Follows Directions: Complex  Dentition: Dentures top  Patient Positioning: Upright in bed  Baseline Vocal Quality: Normal  Prior Dysphagia History: n/a  Consistencies Administered: Regular; Thin - cup    Vision and Hearing  Hearing  Hearing: Exceptions to [facility]  Hearing Exceptions: Hard of hearing/hearing concerns    Current Diet level  Current Diet : Regular  Current Liquid Diet : Thin    Oral Motor  Labial: No impairment  Dentition: Upper dentures  Oral Hygiene: Moist  Lingual: No impairment  Mandible: No impairment    Oral/Pharyngeal Phase  Oral Phase - Comment: WNL oral phase of swallow  Pharyngeal Phase: WNL pharyngeal phase of swallow    PO Trials  Neuromuscular Estim Used: No  Assessment Method(s): Observation  Patient Position: upright in bed  Vocal Quality: No Impairment  Consistency Presented: Regular  How Presented: Self-fed/presented  How much presented: 5 (5 bites regino cracker)  Bolus Acceptance: No impairment  Bolus Formation/Control: No impairment  Propulsion: No impairment  Oral Residue: None  Initiation of Swallow: No impairment  Laryngeal Elevation: Functional  Aspiration Signs/Symptoms: None  Pharyngeal Phase Characteristics: No impairment, issues, or problems  Effective Modifications: None  Cues for Modifications: None  Additional PO Trials: 1      PO Trials 2  Consistency Presented:  Thin  How much presented: 5 (5 sips via cup sip)  Bolus Acceptance: No impairment  Bolus Formation/Control: No impairment  Propulsion: No impairment  Oral Residue: None  Initiation of Swallow: No impairment  Aspiration Signs/Symptoms: None  Pharyngeal Phase Characteristics: No impairment, issues, or problems  Effective Modifications: None  Cues for Modifications: None    Dysphagia Diagnosis  Dysphagia Diagnosis: Swallow function appears WFL  Dysphagia Impression : [facility] oropharyngeal swallowing function  Dysphagia Outcome Severity Scale: Level 7: Normal in all situations     Recommendations  Requires SLP Intervention: No  Diet Solids Recommendation: Regular  Liquid Consistency Recommendation: Thin  Recommended Form of Meds: PO  Duration of Treatment: No deficits warranting dysphagia intervention have been identified at this time  Frequency of Treatment: No deficits warranting dysphagia intervention have been identified at this time    Prognosis  Speech Therapy Prognosis  Prognosis: Excellent    Education  Individuals consulted  Consulted and agree with results and recommendations: Patient;RN  RN Name: Nitza Salgado    Treatment/Goals   [address] in place: Yes  Type of devices: Bed alarm in place;Call light within reach    Pain Assessment  Pain Assessment: Patient c/o pain  Pain Assessment: 0-10  Pain Level: 7  Pain Location: Finger (Comment which one); Head    Pain Re-assessment  Pain Reassessment: Patient c/o pain      Therapy Time  SLP Individual Minutes  Time In: 1665  Time Out: 8223  Minutes: 9              Signature: Electronically signed by CRYSTAL Ortiz on 5/8/2022 at 1:11 PM

## 2022-05-08 NOTE — PLAN OF CARE
Problem: Discharge Planning  Goal: Discharge to home or other facility with appropriate resources  Outcome: Progressing  Flowsheets (Taken 5/7/2022 2100)  Discharge to home or other facility with appropriate resources: Identify barriers to discharge with patient and caregiver     Problem: Pain  Goal: Verbalizes/displays adequate comfort level or baseline comfort level  Outcome: Progressing  Flowsheets  Taken 5/7/2022 2200  Verbalizes/displays adequate comfort level or baseline comfort level: Encourage patient to monitor pain and request assistance  Taken 5/7/2022 2139  Verbalizes/displays adequate comfort level or baseline comfort level: Encourage patient to monitor pain and request assistance  Taken 5/7/2022 2100  Verbalizes/displays adequate comfort level or baseline comfort level: Encourage patient to monitor pain and request assistance  Taken 5/7/2022 2040  Verbalizes/displays adequate comfort level or baseline comfort level: Encourage patient to monitor pain and request assistance     Problem: Skin/Tissue Integrity  Goal: Absence of new skin breakdown  Description: 1. Monitor for areas of redness and/or skin breakdown  2. Assess vascular access sites hourly  3. Every 4-6 hours minimum:  Change oxygen saturation probe site  4. Every 4-6 hours:  If on nasal continuous positive airway pressure, respiratory therapy assess nares and determine need for appliance change or resting period.   Outcome: Progressing     Problem: Safety - Adult  Goal: Free from fall injury  Outcome: Progressing     Problem: ABCDS Injury Assessment  Goal: Absence of physical injury  Outcome: Progressing

## 2022-05-08 NOTE — FLOWSHEET NOTE
11:30: Patient arrived on unit via transport/Wheelchair. Patient was able to ambulate to the bed with standby assistance. Patient is alert and oriented. She was able to explain what brought her into the hospital. Offered ice for facial bruising but patient refused. Vitals were checked and patient was oriented to the room and call light. Standard safety measures implemented. Patient did not express concerns at this time. Call light within reach.  Electronically signed by Roxanne Diaz RN on 5/8/2022 at 11:49 AM

## 2022-05-08 NOTE — PROGRESS NOTES
[facility]   Facility/Department: [facility]  [facility]  Speech Language Pathology  Initial Speech/Language/Cognitive Assessment    NAME:Kirsten Britton  : 1934 (87 y.o.)   MRN: [id number]  ROOM: [id number]/W275-01  ADMISSION DATE: 2022  PATIENT DIAGNOSIS(ES): SAH (subarachnoid hemorrhage) ([facility] [address] [I60.9]  Subarachnoid hemorrhage following injury, no loss of consciousness, initial encounter [facility]) [S06.6X0A]  Chief Complaint   Patient presents with    Fall     pt tripped over her dog and fell, denies LOC, c/o head and neck pain     Patient Active Problem List    Diagnosis Date Noted    Subarachnoid hemorrhage following injury, no loss of consciousness ([facility] [address] 2022    [facility]    Chest pain 2019    Angina at rest [facility]) 2019    NSTEMI (non-ST elevated myocardial infarction) ([facility] [address] [facility] 2019    Hypertension     Hyperlipidemia     GERD (gastroesophageal reflux disease)     Anxiety     Heart disease     Shortness of breath     Hypothyroidism     Obesity     Cancer ([facility]     Senile cataract 2012     Past Medical History:   Diagnosis Date    Anxiety     Cancer [facility])     colon s/p partial colectomy    GERD (gastroesophageal reflux disease)     Heart disease     Hyperlipidemia     Hypertension     Hypothyroidism     Obesity     Shortness of breath      Past Surgical History:   Procedure Laterality Date    BREAST SURGERY      COLECTOMY      partial for colon cancer    COLONOSCOPY      HYSTERECTOMY       DATE ONSET: 2022    Date of Evaluation: 2022   Evaluating Therapist: CRYSTAL Miguel      Diagnosis: Pt presents WFL speech, language, and cognition. Pt independently utilizes compensatory strategies of calendar and keeping lists for memory. Her son has taken over most IADLs. Pt presents with good insight into speech/language abilities/difficulties and reports no changes in word finding or cognition since this most recent fall. No deficits warranting ST intervention have been identified at this time, as pt appears to be at her baseline. Requires SLP Intervention: No     D/C Recommendations: No follow up therapy recommended post discharge    General  Previous level of function and limitations: See below-  General  Chart Reviewed: Yes  Family / Caregiver Present: No  Social/Functional History  Lives With: Son  Type of Home: House  Has the patient had two or more falls in the past year or any fall with injury in the past year?: Unknown  Receives Help From: Family  ADL Assistance: [address]: Needs assistance  Homemaking Responsibilities: Yes (shares with son)  Active : Yes  Mode of Transportation: Car  Education: Pt attended school until the 8th grade. She then went straight to work to help support her family. 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 Leisure & Hobbies: Pt has a new puppy. IADL Comments: Pt states that she used to manage both her and her recently  husbands' IADLs. Since his passing, her son has moved in and taken over most IADLs. 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 She said that ever since her first CVA, she sometimes has delayed word finding, which is her baseline. She says she can usually describe the word or it will come to her after a minute or two.     Vision and Hearing  Vision  Vision:  (Pt reports she has a slight headache so we did not assess reading comprehension)  Hearing  Hearing: Exceptions to [facility]  Hearing Exceptions: Hard of hearing/hearing concerns     Oral/Motor  Oral Motor   Labial: No impairment  Dentition: Upper dentures  Oral Hygiene: Moist  Lingual: No impairment  Mandible: No impairment    Motor Speech  Motor Speech  Apraxic Characteristics: None  Dysarthric Characteristics: None  Intelligibility: No impairment  Overall Impairment Severity: None    Comprehension  Auditory Comprehension  Comprehension: Within Functional Limits    Expression  Expression  Primary Mode of Expression: Verbal  Verbal Expression  Verbal Expression: Within functional limits  Initiation: WFL  Repetition: WFL  Automatic Speech: WFL  Confrontation: WFL  Convergent: Mild  Divergent: WFL  Responsive: WFL  Conversation: Mild  Interfering Components: Impaired thought organization  Written Expression  Written Expression: Unable to assess    Cognition  Orientation  Overall Orientation Status: Within Normal Limits  Attention  Attention: Exceptions to [facility]  Alternating Attention: Mild (Pt does admit to having a headache and being in pain, which likely impacted performance.)  Divided Attention: [facility]  Selective Attention: [facility]  Sustained Attention: [facility]  Memory  Memory: Within Functional Limits (Category clue provided for 3/3 words following a 10 minute delay.)  Problem Solving  Problem Solving: Within Functional Limits  Numeric Reasoning  Numeric Reasoning: Unable to assess  Safety/Judgment  Safety/Judgment: Exceptions to [facility]  Novel Situations: Mild  Insight: WFL  Flexibility of Thought: WFL    Additional Assessments   n/a    Recommendations  Requires SLP Intervention: No  D/C Recommendations: No follow up therapy recommended post discharge  Patient Education: Pt educated on results of SLE  Patient Education Response: Verbalizes understanding  Duration of Treatment: No deficits warranting ST intervention have been identified at this time  Frequency of Treatment: No deficits warranting ST intervention have been identified at this time    Prognosis  Speech Therapy Prognosis  Prognosis: Excellent    Education  Individuals consulted  Consulted and agree with results and recommendations: Patient;RN  RN Name: Karena Joshi    Treatment/Goals     Patient's goals: \"feel better\"    Safety Devices  Safety Devices  Safety Devices in place: Yes  Type of devices: Bed alarm in place;Call light within reach    Pain Assessment  Pain Assessment: Patient c/o pain  Pain Assessment: 0-10  Pain Level: 7  Pain Location: Head;Finger (Comment which one)    Pain Re-assessment  Pain Reassessment: Patient c/o pain         Therapy Time  SLP Individual Minutes  Time In: 9523  Time Out: 1250  Minutes: 26                 Signature: Electronically signed by CRYSTAL Allred on 5/8/2022 at 1:25 PM

## 2022-05-09 VITALS
TEMPERATURE: 98 F | RESPIRATION RATE: 18 BRPM | SYSTOLIC BLOOD PRESSURE: 135 MMHG | DIASTOLIC BLOOD PRESSURE: 46 MMHG | HEART RATE: 60 BPM | WEIGHT: 161.38 LBS | HEIGHT: 66 IN | BODY MASS INDEX: 25.94 KG/M2 | OXYGEN SATURATION: 91 %

## 2022-05-09 DIAGNOSIS — I60.9 SAH (SUBARACHNOID HEMORRHAGE) (HCC): Primary | ICD-10-CM

## 2022-05-09 PROBLEM — G89.11 ACUTE PAIN DUE TO TRAUMA: Status: ACTIVE | Noted: 2022-05-07

## 2022-05-09 PROBLEM — S02.2XXD CLOSED FRACTURE OF NASAL BONE WITH ROUTINE HEALING: Status: ACTIVE | Noted: 2022-05-07

## 2022-05-09 LAB
ANION GAP SERPL CALCULATED.3IONS-SCNC: 14 MEQ/L (ref 9–15)
BASOPHILS ABSOLUTE: 0 K/UL (ref 0–0.2)
BASOPHILS RELATIVE PERCENT: 0.9 %
BUN BLDV-MCNC: 10 MG/DL (ref 8–23)
CALCIUM SERPL-MCNC: 8.8 MG/DL (ref 8.5–9.9)
CHLORIDE BLD-SCNC: 105 MEQ/L (ref 95–107)
CO2: 22 MEQ/L (ref 20–31)
CREAT SERPL-MCNC: 0.85 MG/DL (ref 0.5–0.9)
EOSINOPHILS ABSOLUTE: 0.2 K/UL (ref 0–0.7)
EOSINOPHILS RELATIVE PERCENT: 3.6 %
GFR AFRICAN AMERICAN: >60
GFR NON-AFRICAN AMERICAN: >60
GLUCOSE BLD-MCNC: 95 MG/DL (ref 70–99)
HCT VFR BLD CALC: 32.5 % (ref 37–47)
HEMOGLOBIN: 10.8 G/DL (ref 12–16)
LYMPHOCYTES ABSOLUTE: 0.9 K/UL (ref 1–4.8)
LYMPHOCYTES RELATIVE PERCENT: 18.5 %
MCH RBC QN AUTO: 32.1 PG (ref 27–31.3)
MCHC RBC AUTO-ENTMCNC: 33.3 % (ref 33–37)
MCV RBC AUTO: 96.5 FL (ref 82–100)
MONOCYTES ABSOLUTE: 0.7 K/UL (ref 0.2–0.8)
MONOCYTES RELATIVE PERCENT: 13.5 %
NEUTROPHILS ABSOLUTE: 3.1 K/UL (ref 1.4–6.5)
NEUTROPHILS RELATIVE PERCENT: 63.5 %
PDW BLD-RTO: 13.5 % (ref 11.5–14.5)
PLATELET # BLD: 121 K/UL (ref 130–400)
POTASSIUM REFLEX MAGNESIUM: 4 MEQ/L (ref 3.4–4.9)
RBC # BLD: 3.37 M/UL (ref 4.2–5.4)
SODIUM BLD-SCNC: 141 MEQ/L (ref 135–144)
WBC # BLD: 4.9 K/UL (ref 4.8–10.8)

## 2022-05-09 PROCEDURE — 99239 HOSP IP/OBS DSCHRG MGMT >30: CPT | Performed by: PHYSICIAN ASSISTANT

## 2022-05-09 PROCEDURE — 6360000002 HC RX W HCPCS: Performed by: PHYSICIAN ASSISTANT

## 2022-05-09 PROCEDURE — 99232 SBSQ HOSP IP/OBS MODERATE 35: CPT | Performed by: NEUROLOGICAL SURGERY

## 2022-05-09 PROCEDURE — 6370000000 HC RX 637 (ALT 250 FOR IP): Performed by: PHYSICIAN ASSISTANT

## 2022-05-09 PROCEDURE — 85025 COMPLETE CBC W/AUTO DIFF WBC: CPT

## 2022-05-09 PROCEDURE — 2580000003 HC RX 258: Performed by: PHYSICIAN ASSISTANT

## 2022-05-09 PROCEDURE — 36415 COLL VENOUS BLD VENIPUNCTURE: CPT

## 2022-05-09 PROCEDURE — 6370000000 HC RX 637 (ALT 250 FOR IP): Performed by: NURSE PRACTITIONER

## 2022-05-09 PROCEDURE — 2580000003 HC RX 258: Performed by: SURGERY

## 2022-05-09 PROCEDURE — 80048 BASIC METABOLIC PNL TOTAL CA: CPT

## 2022-05-09 PROCEDURE — 97162 PT EVAL MOD COMPLEX 30 MIN: CPT

## 2022-05-09 PROCEDURE — 6370000000 HC RX 637 (ALT 250 FOR IP): Performed by: SURGERY

## 2022-05-09 RX ORDER — LEVETIRACETAM 500 MG/1
750 TABLET ORAL 2 TIMES DAILY
Qty: 18 TABLET | Refills: 0 | Status: SHIPPED | OUTPATIENT
Start: 2022-05-09 | End: 2022-05-15

## 2022-05-09 RX ORDER — ACETAMINOPHEN 325 MG/1
650 TABLET ORAL EVERY 6 HOURS
Qty: 112 TABLET | Refills: 0 | Status: SHIPPED | OUTPATIENT
Start: 2022-05-09 | End: 2022-05-23

## 2022-05-09 RX ORDER — OXYCODONE HYDROCHLORIDE 5 MG/1
5 TABLET ORAL EVERY 6 HOURS PRN
Qty: 20 TABLET | Refills: 0 | Status: SHIPPED | OUTPATIENT
Start: 2022-05-09 | End: 2022-05-14

## 2022-05-09 RX ADMIN — LOSARTAN POTASSIUM 25 MG: 25 TABLET, FILM COATED ORAL at 09:21

## 2022-05-09 RX ADMIN — LEVETIRACETAM 750 MG: 100 INJECTION, SOLUTION INTRAVENOUS at 09:30

## 2022-05-09 RX ADMIN — BENZOCAINE AND MENTHOL 1 LOZENGE: 15; 3.6 LOZENGE ORAL at 11:33

## 2022-05-09 RX ADMIN — SENNOSIDES AND DOCUSATE SODIUM 1 TABLET: 50; 8.6 TABLET ORAL at 09:21

## 2022-05-09 RX ADMIN — OXYCODONE 5 MG: 5 TABLET ORAL at 01:19

## 2022-05-09 RX ADMIN — OXYCODONE 5 MG: 5 TABLET ORAL at 11:21

## 2022-05-09 RX ADMIN — METOPROLOL TARTRATE 100 MG: 50 TABLET, FILM COATED ORAL at 09:21

## 2022-05-09 RX ADMIN — SODIUM CHLORIDE, PRESERVATIVE FREE 10 ML: 5 INJECTION INTRAVENOUS at 09:23

## 2022-05-09 RX ADMIN — BUSPIRONE HYDROCHLORIDE 10 MG: 10 TABLET ORAL at 09:21

## 2022-05-09 RX ADMIN — OXYCODONE 5 MG: 5 TABLET ORAL at 06:26

## 2022-05-09 RX ADMIN — HYDROCHLOROTHIAZIDE 25 MG: 25 TABLET ORAL at 09:21

## 2022-05-09 ASSESSMENT — PAIN SCALES - GENERAL
PAINLEVEL_OUTOF10: 8
PAINLEVEL_OUTOF10: 9

## 2022-05-09 ASSESSMENT — PAIN DESCRIPTION - LOCATION: LOCATION: OTHER (COMMENT)

## 2022-05-09 NOTE — PROGRESS NOTES
Patient to follow up in four weeks and have ct scan of head completed prior to this appointment. Repeat ct scan for four weeks ordered and patient can have this done at any of the [facility].

## 2022-05-09 NOTE — PROGRESS NOTES
Pt dc education provided. Pts dc appointments made prior to dc. IVs removed, both catheters intact. Pt dc'd home with son at this time.

## 2022-05-09 NOTE — PROGRESS NOTES
Physical Therapy Med Surg Initial Assessment  Facility/Department: [facility]  [facility]       NAME: Kimberly Babb  : 1934 (80 y.o.)  MRN: [id number]  CODE STATUS: Full Code    Date of Service: 2022    Patient Diagnosis(es): SAH (subarachnoid hemorrhage) ([facility] [I60.9]  Subarachnoid hemorrhage following injury, no loss of consciousness, initial encounter [facility]) [S06.6X0A]   Chief Complaint   Patient presents with   Toshia Ping Fall     pt tripped over her dog and fell, denies LOC, c/o head and neck pain     Patient Active Problem List    Diagnosis Date Noted    [facility] of [facility]    SAH (subarachnoid hemorrhage) ([facility] 2022    Chest pain 2019    Angina at rest [facility]) 2019    NSTEMI (non-ST elevated myocardial infarction) [facility] 2019    Hypertension     Hyperlipidemia     GERD (gastroesophageal reflux disease)     Anxiety     Heart disease     Shortness of breath     Hypothyroidism     Obesity     Cancer [facility]     Senile cataract 2012        Past Medical History:   Diagnosis Date    Anxiety     Cancer [facility])     colon s/p partial colectomy    GERD (gastroesophageal reflux disease)     Heart disease     Hyperlipidemia     Hypertension     Hypothyroidism     Obesity     Shortness of breath      Past Surgical History:   Procedure Laterality Date    BREAST SURGERY      COLECTOMY      partial for colon cancer    COLONOSCOPY      HYSTERECTOMY         Patient assessed for rehabilitation services?: Yes  Family / Caregiver Present: No    Restrictions:  Restrictions/Precautions: Fall Risk (high garcia score)  Position Activity Restriction  Other position/activity restrictions: R wrist brace     SUBJECTIVE:   Subjective: \"my dog is a puppy and I fell over her\"    Pain  Pain: back of head and neck 9/10- pre and post session    Prior Level of Function:  Social/Functional History  Lives With: Son (son is retired; pt is never home alone)  Type of Home: House  Home Layout: Two level,Able to Live on Main level with bedroom/bathroom  Home Access: Ramped entrance  Bathroom Shower/Tub: Walk-in shower  Bathroom Equipment: Shower chair,Grab bars in [facility]: Arvil Anasco, 4 wheeled,Cane,Wheelchair-manual,Reacher  Has the patient had two or more falls in the past year or any fall with injury in the past year?: Yes (recently fell backwards while putting pants on)  Receives Help From: Family  ADL Assistance: Independent  Homemaking Assistance: Needs assistance  Homemaking Responsibilities: Yes (shares with son)  Ambulation Assistance: Independent (no AD)  Transfer Assistance: Independent  Active : Yes  Mode of Transportation: Car  Education: Pt attended school until the 8th grade. She then went straight to work to help support her family. 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 Leisure & Hobbies: Pt has a new puppy. IADL Comments: Pt states that she used to manage both her and her recently  husbands' IADLs. Since his passing, her son has moved in and taken over most IADLs. 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 She said that ever since her first CVA, she sometimes has delayed word finding, which is her baseline. She says she can usually describe the word or it will come to her after a minute or two.     OBJECTIVE:   Vision  Vision: Impaired  Vision Exceptions: Wears glasses for reading  Hearing: Exceptions to [facility]  Hearing Exceptions: Hard of hearing/hearing concerns    Cognition:  Overall Orientation Status: Within Functional Limits  Follows Commands: Within Functional Limits    Observation/Palpation  Posture: Fair (slouched)  Observation: pt pleasant and agreeable to therapy eval; facial bruising    ROM:  RLE AROM: WFL  LLE AROM : WFL    Strength:  Strength RLE  Strength RLE: WFL  Strength LLE  Strength LLE: WFL    Neuro:  Balance  Sitting - Static: Good  Sitting - Dynamic: Good  Standing - Static: Good;- (min increased sway without UE support)  Standing - Dynamic: Fair;+ (reaching for furniture to steady self)    Tone: Normal    Sensation: Impaired (tingling in fingers since fall over dog at home)    Bed mobility  Supine to Sit: Modified independent  Sit to Supine: Modified independent  Bed Mobility Comments: HOB elevated; increased time with supine to sit    Transfers  Sit to Stand: Stand by assistance  Stand to sit: Stand by assistance  Comment: min increased sway in standing without use of AD    Ambulation  Surface: level tile  Device: No Device  Assistance: Stand by assistance  Quality of Gait: pt reaching for furniture for steadiness  Gait Deviations: Slow Aarti;Decreased step length;Decreased step height  Distance: 30ft    Stairs/Curb  Stairs?: No    Activity Tolerance  Activity Tolerance: Patient tolerated evaluation without incident    Patient Education  Education Given To: Patient  Education Provided: Role of Therapy;Plan of Care (general safety)  Education Provided Comments: PT educated pt to use [employer] upon initial d/c home and progress to indep amb without AD due to current balance deficits. Education Method: Verbal  Education Outcome: Verbalized understanding       ASSESSMENT:   Body Structures, Functions, Activity Limitations Requiring Skilled Therapeutic Intervention: Decreased functional mobility ; Decreased endurance;Decreased balance; Increased pain  Decision Making: Medium Complexity  History: med  Exam: med  Clinical Presentation: med    Therapy Prognosis: Good    DISCHARGE RECOMMENDATIONS:  Discharge Recommendations: Continue to assess pending progress    Assessment: Pt is 80 y.o. female s/p fall over her dog at home and sustained subarachnoid hemorrhage. Pt exhibits decreased steadiness and activity tolerance with functional mobility.   Pt has a good support system of her son and has equipment at home for safety. Continued PT is required to progress balance for decreased risk for falls at home with son. Requires PT Follow-Up: Yes      PLAN OF CARE:  Plan  Plan: 1 time a day 3-6 times a week  Current Treatment Recommendations: Strengthening,ROM,Balance training,Functional mobility training,Transfer training,Endurance training,Gait training,Stair training,Neuromuscular re-education,Pain management,Home exercise program,Safety education & training,Patient/Caregiver education & training,Equipment evaluation, education, & procurement,Positioning,Therapeutic activities    Safety Devices  Type of Devices: Call light within reach,Bed alarm in place,Left in bed    Goals:  Long Term Goals  Long term goal 1: Pt will demonstrate transfers indep without AD. Long term goal 2: Pt will demonstrate amb >/= 200ft without AD indep. Long term goal 3: Pt will demonstrate brothers balance assessment >/= 50/56 for decreased risk for falls. Long term goal 4: Pt will demonstrate standing HEP indep. [facility] (6 CLICK) BASIC MOBILITY  AM-PAC Inpatient Mobility Raw Score : 18    Therapy Time:   Individual   Time In 1019   Time Out 1035   Minutes [address], PT, 05/09/22 at 10:53 AM         Definitions for assistance levels  Independent = pt does not require any physical supervision or assistance from another person for activity completion. Device may be needed.   Stand by assistance = pt requires verbal cues or instructions from another person, close to but not touching, to perform the activity  Minimal assistance= pt performs 75% or more of the activity; assistance is required to complete the activity  Moderate assistance= pt performs 50% of the activity; assistance is required to complete the activity  Maximal assistance = pt performs 25% of the activity; assistance is required to complete the activity  Dependent = pt requires total physical assistance to accomplish the task

## 2022-05-09 NOTE — PROGRESS NOTES
Progress Note  Patient: Dilip Way  Unit/Bed: I149/K490-53  YOB: 1934  MRN: [id number]  Acct: [de-identified]   Admitting Diagnosis: SAH (subarachnoid hemorrhage) ([facility] [address] [I60.9]  Subarachnoid hemorrhage following injury, no loss of consciousness, initial encounter [facility]) [S06.6X0A]  Date:  5/7/2022  Hospital Day: 2    Chief Complaint:  SAH, following fall with no LOC. Patient complains of headache to posterior head described as an aching sensation that radiates to her neck and shoulders. She denies any vision changes, lightheadedness or dizziness at this time. Physical Examination:    BP (!) 135/46   Pulse 60   Temp 98 °F (36.7 °C) (Oral)   Resp 18   Ht 5' 6\" (1.676 m)   Wt 161 lb 6 oz (73.2 kg)   SpO2 91%   BMI 26.05 kg/m²    Physical Exam       Patient seen sitting up in bed. She is awake, alert, and able to answer questions appropriately. She has multiple ecchymotic areas to the face from her fall. She has good equal bilateral upper and lower extremity strength.          LABS:  CBC:   Lab Results   Component Value Date    WBC 4.9 05/09/2022    RBC 3.37 05/09/2022    RBC 4.17 10/27/2011    HGB 10.8 05/09/2022    HCT 32.5 05/09/2022    MCV 96.5 05/09/2022    MCH 32.1 05/09/2022    MCHC 33.3 05/09/2022    RDW 13.5 05/09/2022     05/09/2022    MPV 9.7 07/15/2014     CBC with Differential:   Lab Results   Component Value Date    WBC 4.9 05/09/2022    RBC 3.37 05/09/2022    RBC 4.17 10/27/2011    HGB 10.8 05/09/2022    HCT 32.5 05/09/2022     05/09/2022    MCV 96.5 05/09/2022    MCH 32.1 05/09/2022    MCHC 33.3 05/09/2022    RDW 13.5 05/09/2022    LYMPHOPCT 18.5 05/09/2022    MONOPCT 13.5 05/09/2022    BASOPCT 0.9 05/09/2022    MONOSABS 0.7 05/09/2022    LYMPHSABS 0.9 05/09/2022    EOSABS 0.2 05/09/2022    BASOSABS 0.0 05/09/2022     CMP:    Lab Results   Component Value Date     05/09/2022    K 4.0 05/09/2022     05/09/2022    CO2 22 05/09/2022    BUN 10 05/09/2022    CREATININE 0.85 05/09/2022    GFRAA >60.0 05/09/2022    LABGLOM >60.0 05/09/2022    GLUCOSE 95 05/09/2022    GLUCOSE 85 10/27/2011    PROT 7.1 05/07/2022    LABALBU 4.3 05/07/2022    CALCIUM 8.8 05/09/2022    BILITOT 0.5 05/07/2022    ALKPHOS 73 05/07/2022    AST 25 05/07/2022    ALT 14 05/07/2022     BMP:    Lab Results   Component Value Date     05/09/2022    K 4.0 05/09/2022     05/09/2022    CO2 22 05/09/2022    BUN 10 05/09/2022    LABALBU 4.3 05/07/2022    CREATININE 0.85 05/09/2022    CALCIUM 8.8 05/09/2022    GFRAA >60.0 05/09/2022    LABGLOM >60.0 05/09/2022    GLUCOSE 95 05/09/2022    GLUCOSE 85 10/27/2011     Magnesium:  No results found for: MG      Assessment:    Active Hospital Problems    Diagnosis Date Noted    Subarachnoid hemorrhage following injury, no loss of consciousness ([facility] [address] [S06.6X0A] 05/07/2022     Priority: Medium    SAH (subarachnoid hemorrhage) ([facility] [address] [I60.9] 05/07/2022     Priority: Medium        EXAMINATION:  CT HEAD WO CONTRAST       HISTORY:   Follow up [address]  -        TECHNIQUE: CT head without contrast. All CT scans at this facility use dose modulation, iterative reconstruction, and/or weight based dosing when appropriate to reduce radiation dose to as low as reasonably achievable.       COMPARISON:  CT brain 5/7/2022       RESULT:       Post-operative change:  None.       Acute change:   No evidence of an acute intracranial process.         Hemorrhage:    Redemonstration of multifocal subarachnoid hemorrhage; for example at the right frontal lobe, interhemispheric falx, left temporal lobe and superior right parasagittal region. No new hemorrhage.  No significant mass effect.        Chronic change:   Patchy foci of  low attenuation coefficient are present within the supratentorial white matter which is a nonspecific finding but likely represents moderate microvascular ischemia.    Atherosclerotic calcification of the carotid siphons    and vertebrobasilar arteries.       Parenchyma:  Moderate generalized volume loss.        Ventricles:   Ventricular enlargement concordant with the degree of parenchymal volume loss.        Other:  The calvarium, skull base, imaged paranasal sinuses, mastoids are unremarkable.  Moderate right periorbital and malar eminence soft tissue swelling and underlying superior frontal scalp hematoma. Status post bilateral lens replacements.           Impression       Stable multifocal subarachnoid hemorrhage as described without significant mass effect. Many hemorrhage. Right frontal scalp hematoma and soft tissue swelling.              Patient remains stable since hospitalization. Will continue to follow. Repeat ct scan of head without contrast tomorrow 5/10/22.        Electronically signedby JEANCARLOS Henderson - CNP on 5/9/2022 at 9:29 AM

## 2022-05-09 NOTE — DISCHARGE SUMMARY
[address]  [address]  [place] [address]  MRN: [id number]  YOB: 1934  80 y. o.female      Attending  Angelica Fallon MD ?   Date of Admission  5/7/2022 Date of Discharge  5/9/2022      ? DIAGNOSES:  Principal Problem:    SAH (subarachnoid hemorrhage) (HCC)  Active Problems:    Subarachnoid hemorrhage following injury, no loss of consciousness ([facility])    Closed fracture of nasal bone with routine healing    Acute pain due to trauma  Resolved Problems:    * No resolved hospital problems. *      PROCEDURES: None  ? DISCHARGE MEDICATIONS:  Current Discharge Medication List           Details   levETIRAcetam (KEPPRA) 500 MG tablet Take 1.5 tablets by mouth 2 times daily for 6 days  Qty: 18 tablet, Refills: 0      oxyCODONE (ROXICODONE) 5 MG immediate release tablet Take 1 tablet by mouth every 6 hours as needed (for severe pain only) for up to 5 days. Qty: 20 tablet, Refills: 0    Comments: Reduce doses taken as pain becomes manageable  Associated Diagnoses: Subarachnoid hemorrhage following injury, no loss of consciousness, [facility] Acute pain due to trauma;  Closed fracture of nasal bone, initial encounter              Details   acetaminophen (TYLENOL) 325 MG tablet Take 2 tablets by mouth every 6 hours for 14 days  Qty: 112 tablet, Refills: 0              Details   nitroGLYCERIN (NITROSTAT) 0.4 MG SL tablet Place 0.4 mg under the tongue every 5 minutes as needed      metoprolol tartrate (LOPRESSOR) 50 MG tablet Take 50 mg by mouth 2 times daily      sertraline (ZOLOFT) 50 MG tablet Take 50 mg by mouth daily      melatonin 3 MG TABS tablet Take 3 mg by mouth nightly      losartan (COZAAR) 50 MG tablet Take 50 mg by mouth daily      busPIRone (BUSPAR) 15 MG tablet Take 15 mg by mouth daily      atorvastatin (LIPITOR) 20 MG tablet Take 20 mg by mouth nightly  Refills: 3      hydrochlorothiazide (HYDRODIURIL) 25 MG tablet Take 25 mg by mouth daily  Refills: 3      meclizine (ANTIVERT) 25 MG tablet Take 25 mg by mouth 3 times daily as needed  Refills: 2      zolpidem (AMBIEN) 5 MG tablet Take 5 mg by mouth nightly as needed. Refills: 2      Omega 3 1000 MG CAPS Take 1,000 mg by mouth      vitamin E 400 UNIT capsule Take 400 Units by mouth daily              REASON FOR HOSPITALIZATION:  Chidi Vasquez is a 80 y.o. female with a PMHx of colon CA, HTN, CAD (ASA and Plavix for CAD s/p PCI 3 years ago), HTN, HLD presented to [facility] EM[facility]GENC[facility] [place] ED s/p mechanical trip and fall over her dog while at home on 5/7. (+) head strike, (-) LOC, (+) ASA and Plavix.      Trauma workup found scattered SAH and right nasal fracture. She was subsequently admitted to our trauma service with NSGY on consult. SIGNIFICANT FINDINGS:  Catalog of Injuries:   Diagnoses:  1. S/p mechanical fall from standing on 5/7  2. Scattered SAH     PMHx: colon CA, HTN, CAD (ASA and Plavix for CAD s/p PCI 3 years ago), HTN, HLD     Incidental Findings: None, JLR, 5/8/22. HOSPITAL COURSE:  5/7/2022: Admitted to trauma for [facility] s/p mechanical fall from standing over dog at home on 5/7. (+) head strike, (-) LOC, (+) ASA and Plavix. NSGY consulted. 5/8/2022: [facility] stable. Transferred out of ICU. Br[facility] Posada without acute interventions. 5/9/2022: PT/OT eval and rec's for home with home health care, however, patient declining services. Neuro exams stable. Medically cleared for discharge home today with son. At time of discharge, Keenan Perla was tolerating a regular diet, having bowel movements, and had adequate analgesia on oral pain medications. Pt's activity level was as tolerated. The patient had no signs or symptoms of complications. Patient was determined stable for discharge to: Home    The patient was seen and examined on the day of discharge with the following findings:  Constitutional: Lying supine in bed. Alert and conversant. In good spirits. HEENT: Nasal bone ecchymosis and swelling.  Laceration above right eyebrow, c/d/i. Cardiovascular: Regular rate and rhythm on telemetry. Pulmonary: Clear to ausculation bilaterally on room air. No wheezing, rhonchi or rales. Abdominal: Soft. Non-distended. Non-tender. Musculoskeletal: Good ROM in all extremities. No edema. Neurological: Alert, awake, and orientated x 3. Motor and sensory grossly intact. No focal deficits. GCS of 15. No focal deficits. ANTICIPATED FOLLOW UP:  No future appointments. No discharge procedures on file. Other indicated follow up and instructions for scheduling:  - Follow up with Neurosurgery (Dr. Erma Rollins) in 4 week with repeat CT Head. - Follow with ENT as an outpatient for right nasal bone fracture as needed. - No need for trauma follow up. VTE RISK AT DISCHARGE:  Per trauma program protocol, the patient does not require post-discharge VTE prophylaxis due to: NWB single LE or BLE fractures limiting mobility. --  Yelitza Thacker PA-C  Trauma/Critical Care  Emergency General Surgery    >30 minutes were spent on the discharge of this patient including final examination of the patient, discussion of the hospital stay, instructions for continuing care to all relevant caregivers, preparation of discharge records, prescriptions and referral forms, and clear identification of reasons to return to clinic or to emergency room.

## 2022-06-16 ENCOUNTER — TELEPHONE (OUTPATIENT)
Dept: NEUROSURGERY | Age: 87
End: 2022-06-16

## 2022-06-16 NOTE — TELEPHONE ENCOUNTER
Pt is scheduled to see Dr. Josie Camarena on 6-21. However, head CT has not been done (ordered by [employer] on 5-9-22). Pt should have head CT done @ [facility] prior to seeing Dr. Josie Camarena. Pt was called & [facility]. When pt calls back, please inform and have pt call [facility] to schedule CT scan and see Dr. Josie Camarena after completed.

## 2022-06-20 NOTE — TELEPHONE ENCOUNTER
PT son was not aware of appointment.  He will call to schedule CT and will call back to schedule f/u,

## 2023-04-10 PROBLEM — N18.31 STAGE 3A CHRONIC KIDNEY DISEASE (MULTI): Status: ACTIVE | Noted: 2023-04-10

## 2023-04-10 PROBLEM — I25.10 CAD (CORONARY ARTERY DISEASE): Status: ACTIVE | Noted: 2023-04-10

## 2023-04-10 PROBLEM — S02.2XXA NASAL FRACTURE: Status: ACTIVE | Noted: 2023-04-10

## 2023-04-10 PROBLEM — I21.9 MYOCARDIAL INFARCTION (MULTI): Status: ACTIVE | Noted: 2023-04-10

## 2023-04-10 PROBLEM — S06.6XAA SUBARACHNOID HEMORRHAGE, TRAUMATIC (MULTI): Status: ACTIVE | Noted: 2023-04-10

## 2023-04-10 PROBLEM — F41.9 ANXIETY AND DEPRESSION: Status: ACTIVE | Noted: 2023-04-10

## 2023-04-10 PROBLEM — R42 DIZZINESS: Status: ACTIVE | Noted: 2023-04-10

## 2023-04-10 PROBLEM — M54.2 CHRONIC NECK PAIN: Status: ACTIVE | Noted: 2023-04-10

## 2023-04-10 PROBLEM — G89.29 BACK PAIN, CHRONIC: Status: ACTIVE | Noted: 2023-04-10

## 2023-04-10 PROBLEM — R00.1 SINUS BRADYCARDIA: Status: ACTIVE | Noted: 2023-04-10

## 2023-04-10 PROBLEM — R25.2 LEG CRAMPS: Status: ACTIVE | Noted: 2023-04-10

## 2023-04-10 PROBLEM — G47.00 INSOMNIA: Status: ACTIVE | Noted: 2023-04-10

## 2023-04-10 PROBLEM — M54.9 BACK PAIN, CHRONIC: Status: ACTIVE | Noted: 2023-04-10

## 2023-04-10 PROBLEM — M79.642 PAIN OF LEFT HAND: Status: ACTIVE | Noted: 2023-04-10

## 2023-04-10 PROBLEM — E05.90 HYPERTHYROIDISM: Status: ACTIVE | Noted: 2023-04-10

## 2023-04-10 PROBLEM — E78.49 OTHER HYPERLIPIDEMIA: Status: ACTIVE | Noted: 2023-04-10

## 2023-04-10 PROBLEM — I51.9 MILD DIASTOLIC DYSFUNCTION: Status: ACTIVE | Noted: 2023-04-10

## 2023-04-10 PROBLEM — I65.21 CALCIFICATION OF RIGHT CAROTID ARTERY: Status: ACTIVE | Noted: 2023-04-10

## 2023-04-10 PROBLEM — I08.0 MILD MITRAL AND AORTIC REGURGITATION: Status: ACTIVE | Noted: 2023-04-10

## 2023-04-10 PROBLEM — F32.A ANXIETY AND DEPRESSION: Status: ACTIVE | Noted: 2023-04-10

## 2023-04-10 PROBLEM — G89.29 CHRONIC NECK PAIN: Status: ACTIVE | Noted: 2023-04-10

## 2023-04-10 PROBLEM — I10 HYPERTENSION: Status: ACTIVE | Noted: 2023-04-10

## 2023-04-27 DIAGNOSIS — I25.10 CORONARY ARTERY DISEASE INVOLVING NATIVE HEART, UNSPECIFIED VESSEL OR LESION TYPE, UNSPECIFIED WHETHER ANGINA PRESENT: ICD-10-CM

## 2023-04-27 DIAGNOSIS — F51.01 PRIMARY INSOMNIA: Primary | ICD-10-CM

## 2023-04-27 RX ORDER — OXYCODONE HYDROCHLORIDE 5 MG/1
1 TABLET ORAL EVERY 6 HOURS PRN
COMMUNITY
Start: 2022-05-18 | End: 2024-02-14

## 2023-04-27 RX ORDER — PNV NO.95/FERROUS FUM/FOLIC AC 28MG-0.8MG
TABLET ORAL
COMMUNITY

## 2023-04-27 RX ORDER — CLOPIDOGREL BISULFATE 75 MG/1
TABLET ORAL
Qty: 30 TABLET | Refills: 0 | Status: SHIPPED | OUTPATIENT
Start: 2023-04-27 | End: 2023-06-13

## 2023-04-27 RX ORDER — ZOLPIDEM TARTRATE 5 MG/1
5 TABLET ORAL NIGHTLY
Qty: 90 TABLET | Refills: 0 | Status: SHIPPED | OUTPATIENT
Start: 2023-04-27 | End: 2023-07-18

## 2023-04-27 RX ORDER — ACETAMINOPHEN 325 MG/1
TABLET ORAL EVERY 6 HOURS
COMMUNITY

## 2023-04-27 RX ORDER — NITROGLYCERIN 0.4 MG/1
TABLET SUBLINGUAL
COMMUNITY
Start: 2020-03-10 | End: 2024-02-14 | Stop reason: SDUPTHER

## 2023-04-27 RX ORDER — LEVETIRACETAM 500 MG/1
TABLET, FILM COATED ORAL 2 TIMES DAILY
COMMUNITY
Start: 2022-05-11 | End: 2023-08-14

## 2023-04-27 RX ORDER — TIZANIDINE 2 MG/1
2 TABLET ORAL NIGHTLY
COMMUNITY
Start: 2022-09-13 | End: 2023-08-14

## 2023-05-08 DIAGNOSIS — E78.49 OTHER HYPERLIPIDEMIA: ICD-10-CM

## 2023-05-08 DIAGNOSIS — F41.9 ANXIETY AND DEPRESSION: ICD-10-CM

## 2023-05-08 DIAGNOSIS — F32.A ANXIETY AND DEPRESSION: ICD-10-CM

## 2023-05-08 DIAGNOSIS — I10 HYPERTENSION, UNSPECIFIED TYPE: ICD-10-CM

## 2023-05-08 RX ORDER — ATORVASTATIN CALCIUM 20 MG/1
TABLET, FILM COATED ORAL
Qty: 90 TABLET | Refills: 0 | Status: SHIPPED | OUTPATIENT
Start: 2023-05-08 | End: 2023-08-11

## 2023-05-08 RX ORDER — METOPROLOL TARTRATE 50 MG/1
TABLET ORAL
Qty: 60 TABLET | Refills: 0 | Status: SHIPPED | OUTPATIENT
Start: 2023-05-08 | End: 2023-06-13

## 2023-05-08 RX ORDER — SERTRALINE HYDROCHLORIDE 50 MG/1
TABLET, FILM COATED ORAL
Qty: 30 TABLET | Refills: 0 | Status: SHIPPED | OUTPATIENT
Start: 2023-05-08 | End: 2023-06-13

## 2023-05-18 ENCOUNTER — APPOINTMENT (OUTPATIENT)
Dept: PRIMARY CARE | Facility: CLINIC | Age: 88
End: 2023-05-18
Payer: MEDICARE

## 2023-06-13 DIAGNOSIS — I25.10 CORONARY ARTERY DISEASE INVOLVING NATIVE HEART, UNSPECIFIED VESSEL OR LESION TYPE, UNSPECIFIED WHETHER ANGINA PRESENT: ICD-10-CM

## 2023-06-13 DIAGNOSIS — F32.A ANXIETY AND DEPRESSION: ICD-10-CM

## 2023-06-13 DIAGNOSIS — F41.9 ANXIETY AND DEPRESSION: ICD-10-CM

## 2023-06-13 DIAGNOSIS — I10 HYPERTENSION, UNSPECIFIED TYPE: ICD-10-CM

## 2023-06-13 RX ORDER — BUSPIRONE HYDROCHLORIDE 15 MG/1
TABLET ORAL
Qty: 60 TABLET | Refills: 3 | Status: SHIPPED | OUTPATIENT
Start: 2023-06-13 | End: 2023-10-13

## 2023-06-13 RX ORDER — CLOPIDOGREL BISULFATE 75 MG/1
TABLET ORAL
Qty: 30 TABLET | Refills: 3 | Status: SHIPPED | OUTPATIENT
Start: 2023-06-13 | End: 2023-10-13

## 2023-06-13 RX ORDER — SERTRALINE HYDROCHLORIDE 50 MG/1
TABLET, FILM COATED ORAL
Qty: 30 TABLET | Refills: 3 | Status: SHIPPED | OUTPATIENT
Start: 2023-06-13 | End: 2023-08-14

## 2023-06-13 RX ORDER — METOPROLOL TARTRATE 50 MG/1
TABLET ORAL
Qty: 60 TABLET | Refills: 3 | Status: SHIPPED | OUTPATIENT
Start: 2023-06-13 | End: 2023-08-14

## 2023-07-18 DIAGNOSIS — F51.01 PRIMARY INSOMNIA: ICD-10-CM

## 2023-07-18 RX ORDER — ZOLPIDEM TARTRATE 5 MG/1
TABLET ORAL
Qty: 90 TABLET | Refills: 0 | Status: SHIPPED | OUTPATIENT
Start: 2023-07-18 | End: 2023-07-24 | Stop reason: SDUPTHER

## 2023-07-24 DIAGNOSIS — F51.01 PRIMARY INSOMNIA: ICD-10-CM

## 2023-07-24 RX ORDER — ZOLPIDEM TARTRATE 5 MG/1
5 TABLET ORAL NIGHTLY
Qty: 90 TABLET | Refills: 0 | Status: SHIPPED | OUTPATIENT
Start: 2023-07-24 | End: 2023-10-23

## 2023-07-24 NOTE — TELEPHONE ENCOUNTER
Pt son called back and was told by pharmacy that they never received refill request. Son would like to know if we can resend it.

## 2023-07-24 NOTE — TELEPHONE ENCOUNTER
Rx Refill Request Telephone Encounter    Name:  Amy Alexander  :  [id number]  Medication Name:  Ambien 5mg            Specific Pharmacy location:  [address]  Date of last appointment:  n/a    Date of next appointment:  n/a  Best number to reach patient:  Pt has an appt on 23 but has ran out of medication

## 2023-08-11 DIAGNOSIS — E78.49 OTHER HYPERLIPIDEMIA: ICD-10-CM

## 2023-08-11 RX ORDER — ATORVASTATIN CALCIUM 20 MG/1
20 TABLET, FILM COATED ORAL DAILY
Qty: 90 TABLET | Refills: 3 | Status: SHIPPED | OUTPATIENT
Start: 2023-08-11

## 2023-08-14 ENCOUNTER — OFFICE VISIT (OUTPATIENT)
Dept: PRIMARY CARE | Facility: CLINIC | Age: 88
End: 2023-08-14
Payer: MEDICARE

## 2023-08-14 VITALS
HEART RATE: 63 BPM | OXYGEN SATURATION: 98 % | SYSTOLIC BLOOD PRESSURE: 108 MMHG | WEIGHT: 124 LBS | HEIGHT: 60 IN | DIASTOLIC BLOOD PRESSURE: 68 MMHG | BODY MASS INDEX: 24.35 KG/M2

## 2023-08-14 DIAGNOSIS — E05.90 HYPERTHYROIDISM: ICD-10-CM

## 2023-08-14 DIAGNOSIS — S06.6XAD TRAUMATIC SUBARACHNOID HEMORRHAGE WITH UNKNOWN LOSS OF CONSCIOUSNESS STATUS, SUBSEQUENT ENCOUNTER: ICD-10-CM

## 2023-08-14 DIAGNOSIS — N18.31 STAGE 3A CHRONIC KIDNEY DISEASE (MULTI): ICD-10-CM

## 2023-08-14 DIAGNOSIS — Z78.0 ENCOUNTER FOR OSTEOPOROSIS SCREENING IN ASYMPTOMATIC POSTMENOPAUSAL PATIENT: ICD-10-CM

## 2023-08-14 DIAGNOSIS — Z13.820 ENCOUNTER FOR OSTEOPOROSIS SCREENING IN ASYMPTOMATIC POSTMENOPAUSAL PATIENT: ICD-10-CM

## 2023-08-14 DIAGNOSIS — R42 CHRONIC VERTIGO: ICD-10-CM

## 2023-08-14 DIAGNOSIS — Z79.899 HIGH RISK MEDICATION USE: ICD-10-CM

## 2023-08-14 DIAGNOSIS — E78.49 OTHER HYPERLIPIDEMIA: ICD-10-CM

## 2023-08-14 DIAGNOSIS — Z00.00 ROUTINE GENERAL MEDICAL EXAMINATION AT HEALTH CARE FACILITY: Primary | ICD-10-CM

## 2023-08-14 DIAGNOSIS — I10 HYPERTENSION, UNSPECIFIED TYPE: ICD-10-CM

## 2023-08-14 DIAGNOSIS — G47.00 INSOMNIA, UNSPECIFIED TYPE: ICD-10-CM

## 2023-08-14 PROBLEM — R25.2 LEG CRAMPS: Status: RESOLVED | Noted: 2023-04-10 | Resolved: 2023-08-14

## 2023-08-14 PROBLEM — S06.6XAA SUBARACHNOID HEMORRHAGE, TRAUMATIC (MULTI): Status: RESOLVED | Noted: 2023-04-10 | Resolved: 2023-08-14

## 2023-08-14 PROCEDURE — 80368 SEDATIVE HYPNOTICS: CPT

## 2023-08-14 PROCEDURE — 1160F RVW MEDS BY RX/DR IN RCRD: CPT | Performed by: FAMILY MEDICINE

## 2023-08-14 PROCEDURE — 80373 DRUG SCREENING TRAMADOL: CPT

## 2023-08-14 PROCEDURE — 1036F TOBACCO NON-USER: CPT | Performed by: FAMILY MEDICINE

## 2023-08-14 PROCEDURE — 3074F SYST BP LT 130 MM HG: CPT | Performed by: FAMILY MEDICINE

## 2023-08-14 PROCEDURE — 80346 BENZODIAZEPINES1-12: CPT

## 2023-08-14 PROCEDURE — 80358 DRUG SCREENING METHADONE: CPT

## 2023-08-14 PROCEDURE — 80361 OPIATES 1 OR MORE: CPT

## 2023-08-14 PROCEDURE — 80365 DRUG SCREENING OXYCODONE: CPT

## 2023-08-14 PROCEDURE — 1157F ADVNC CARE PLAN IN RCRD: CPT | Performed by: FAMILY MEDICINE

## 2023-08-14 PROCEDURE — 3078F DIAST BP <80 MM HG: CPT | Performed by: FAMILY MEDICINE

## 2023-08-14 PROCEDURE — G0009 ADMIN PNEUMOCOCCAL VACCINE: HCPCS | Performed by: FAMILY MEDICINE

## 2023-08-14 PROCEDURE — 1159F MED LIST DOCD IN RCRD: CPT | Performed by: FAMILY MEDICINE

## 2023-08-14 PROCEDURE — 80354 DRUG SCREENING FENTANYL: CPT

## 2023-08-14 PROCEDURE — 80307 DRUG TEST PRSMV CHEM ANLYZR: CPT

## 2023-08-14 PROCEDURE — G0439 PPPS, SUBSEQ VISIT: HCPCS | Performed by: FAMILY MEDICINE

## 2023-08-14 PROCEDURE — 90677 PCV20 VACCINE IM: CPT | Performed by: FAMILY MEDICINE

## 2023-08-14 PROCEDURE — 1170F FXNL STATUS ASSESSED: CPT | Performed by: FAMILY MEDICINE

## 2023-08-14 PROCEDURE — 99214 OFFICE O/P EST MOD 30 MIN: CPT | Performed by: FAMILY MEDICINE

## 2023-08-14 RX ORDER — MECLIZINE HYDROCHLORIDE 25 MG/1
25 TABLET ORAL 3 TIMES DAILY PRN
Qty: 30 TABLET | Refills: 1 | Status: SHIPPED | OUTPATIENT
Start: 2023-08-14 | End: 2023-11-08

## 2023-08-14 ASSESSMENT — PATIENT HEALTH QUESTIONNAIRE - PHQ9
SUM OF ALL RESPONSES TO PHQ9 QUESTIONS 1 AND 2: 0
1. LITTLE INTEREST OR PLEASURE IN DOING THINGS: NOT AT ALL
2. FEELING DOWN, DEPRESSED OR HOPELESS: NOT AT ALL

## 2023-08-14 ASSESSMENT — ACTIVITIES OF DAILY LIVING (ADL)
TAKING_MEDICATION: TOTAL CARE
DRESSING: INDEPENDENT
GROCERY_SHOPPING: TOTAL CARE
DOING_HOUSEWORK: INDEPENDENT
MANAGING_FINANCES: TOTAL CARE
BATHING: INDEPENDENT

## 2023-08-14 ASSESSMENT — ENCOUNTER SYMPTOMS
ARTHRALGIAS: 1
DIZZINESS: 0
WHEEZING: 0
COUGH: 0
FEVER: 0
HEADACHES: 0
NECK PAIN: 1
NERVOUS/ANXIOUS: 1
SHORTNESS OF BREATH: 0

## 2023-08-14 NOTE — PROGRESS NOTES
Subjective   Patient ID: Amy Alexander is a 88 y.o. female who presents for Annual Exam.          HPI     She is here today for annual physical and for follow-up  No major concerns today  Has not had any falls in the past year  She has a history of hypertension currently taking losartan 50 mg twice daily, metoprolol 50 mg twice daily, and HCTZ 25 mg daily.  Denies any lightheadedness or dizziness  She is taking atorvastatin 20 mg daily for hyperlipidemia.  Last LDL done February 2022 was 66  She has a history of insomnia currently taking Ambien 5 mg nightly.  She has been taking this medication for several years and has been doing well with that.  It helps her fall asleep.  She will sleep 4 to 5 hours each night.  She will wake up 2 times per night but does not have any difficulty falling back asleep.  She denies any adverse effects with medication including morning drowsiness  She follows with pain management for chronic neck pain  Follows with cardiology annually for CAD with history of MI and 3 stents 2/2019.  Denies any chest pain or shortness of breath.  Takes aspirin and Plavix  She would also like a refill on meclizine for chronic vertigo.  She has a history of chronic recurrent episodes of vertigo which have been present for approximately 25 to 30 years.  She will take this medication approximately 3 times per months with relief.  No adverse effects    Review of Systems   Constitutional:  Negative for fever.   Respiratory:  Negative for cough, shortness of breath and wheezing.    Cardiovascular:  Negative for chest pain and leg swelling.   Musculoskeletal:  Positive for arthralgias and neck pain.   Neurological:  Negative for dizziness and headaches.   Psychiatric/Behavioral:  The patient is nervous/anxious.    All other systems reviewed and are negative.      Objective   /68   Pulse 63   Ht 1.524 m (5')   Wt 56.2 kg (124 lb)   SpO2 98%   BMI 24.22 kg/m²     Physical Exam  Vitals reviewed.    Constitutional:       General: She is not in acute distress.     Appearance: Normal appearance. She is well-developed.      Comments: Using cane to help with ambulation   HENT:      Head: Normocephalic.      Right Ear: Tympanic membrane, ear canal and external ear normal.      Left Ear: Tympanic membrane, ear canal and external ear normal.      Nose: Nose normal.      Mouth/Throat:      Mouth: Mucous membranes are moist.   Eyes:      Conjunctiva/sclera: Conjunctivae normal.   Neck:      Thyroid: No thyromegaly.      Vascular: No JVD.   Cardiovascular:      Rate and Rhythm: Normal rate and regular rhythm.      Heart sounds: Normal heart sounds.   Pulmonary:      Effort: Pulmonary effort is normal.      Breath sounds: Normal breath sounds.   Abdominal:      Palpations: Abdomen is soft.      Tenderness: There is no abdominal tenderness.   Musculoskeletal:         General: Normal range of motion.      Right lower leg: No edema.      Left lower leg: No edema.   Lymphadenopathy:      Cervical: No cervical adenopathy.   Skin:     Findings: No rash.   Neurological:      Mental Status: She is alert and oriented to person, place, and time.   Psychiatric:         Mood and Affect: Mood normal.         Behavior: Behavior normal.         Assessment/Plan   Problem List Items Addressed This Visit          Medium    Hypertension    Relevant Orders    CBC    Comprehensive Metabolic Panel    Lipid Panel    Hyperthyroidism    Relevant Orders    Thyroid Stimulating Hormone    Thyroxine, Free    Triiodothyronine, Free    XR DEXA bone density    Insomnia    Relevant Orders    Opiate/Opioid/Benzo Extended Prescription Compliance    Other hyperlipidemia    Stage 3a chronic kidney disease (CMS/HCC)     Check kidney function with next labs         RESOLVED: Subarachnoid hemorrhage, traumatic (CMS/HCC)     Stable based on symptoms and exam.  Continue established treatment plan and follow-up at least yearly            Other Visit Diagnoses        Routine general medical examination at health care facility    -  Primary    Chronic vertigo        Relevant Medications    meclizine (Antivert) 25 mg tablet    Encounter for osteoporosis screening in asymptomatic postmenopausal patient        Relevant Orders    XR DEXA bone density    High risk medication use        Relevant Orders    Opiate/Opioid/Benzo Extended Prescription Compliance          #1 preventative health  Colon cancer and breast cancer screening not indicated due to age  Ordered a DEXA scan for osteoporosis screening  Given Prevnar 20 vaccination today.  Recommended Shingrix vaccine at health department  2.  Hypertension: This is well controlled with blood pressure 108/68.  Continue current medications  3.  Hyperlipidemia: Last LDL was at goal.  Continue atorvastatin at current dose  4.  Insomnia: This has been stable and has remained adequately controlled on Ambien 5 mg nightly.  She has been taking this medication for several years and has been tolerating well.  She does currently follow with pain management for chronic neck pain and was started on hydrocodone for this last year.  At her last visit we discussed risk of interaction with these medications in detail including risk of overdose, and it was discussed that these medications should never be taken at the same time.  Risks of interaction again were discussed today and she will continue to follow with pain management for this.  Signed CSA and obtain urine tox screen today  An OARRS report was printed and I have personally reviewed the results.  The report will be scanned into the health record.  I have considered the risk of abuse, dependance, addiction, and diversion.  I believe it is clinically appropriate for this patient to continue taking this medication.  I also gave her a refill on meclizine for chronic vertigo.  We also discussed that this medication can cause drowsiness, and she should not take this med with any other sedating  meds including any pain medication or Ambien.  Follow-up in 6 months for recheck  Continue to follow with cardiology for CAD

## 2023-08-17 LAB
6-ACETYLMORPHINE: <25 NG/ML
7-AMINOCLONAZEPAM: <25 NG/ML
ALPHA-HYDROXYALPRAZOLAM: <25 NG/ML
ALPHA-HYDROXYMIDAZOLAM: <25 NG/ML
ALPRAZOLAM: <25 NG/ML
AMPHETAMINE (PRESENCE) IN URINE BY SCREEN METHOD: ABNORMAL
BARBITURATES PRESENCE IN URINE BY SCREEN METHOD: ABNORMAL
CANNABINOIDS IN URINE BY SCREEN METHOD: ABNORMAL
CHLORDIAZEPOXIDE: <25 NG/ML
CLONAZEPAM: <25 NG/ML
COCAINE (PRESENCE) IN URINE BY SCREEN METHOD: ABNORMAL
CODEINE: <50 NG/ML
CREATINE, URINE FOR DRUG: 75.5 MG/DL
DIAZEPAM: <25 NG/ML
DRUG SCREEN COMMENT URINE: ABNORMAL
EDDP: <25 NG/ML
FENTANYL CONFIRMATION, URINE: <2.5 NG/ML
HYDROCODONE: <25 NG/ML
HYDROMORPHONE: <25 NG/ML
LORAZEPAM: <25 NG/ML
METHADONE CONFIRMATION,URINE: <25 NG/ML
MIDAZOLAM: <25 NG/ML
MORPHINE URINE: <50 NG/ML
NORDIAZEPAM: <25 NG/ML
NORFENTANYL: <2.5 NG/ML
NORHYDROCODONE: <25 NG/ML
NOROXYCODONE: <25 NG/ML
O-DESMETHYLTRAMADOL: <50 NG/ML
OXAZEPAM: <25 NG/ML
OXYCODONE: <25 NG/ML
OXYMORPHONE: <25 NG/ML
PHENCYCLIDINE (PRESENCE) IN URINE BY SCREEN METHOD: ABNORMAL
TEMAZEPAM: <25 NG/ML
TRAMADOL: <50 NG/ML
ZOLPIDEM METABOLITE (ZCA): >1000 NG/ML
ZOLPIDEM: <25 NG/ML

## 2023-10-13 DIAGNOSIS — F41.9 ANXIETY AND DEPRESSION: ICD-10-CM

## 2023-10-13 DIAGNOSIS — F32.A ANXIETY AND DEPRESSION: ICD-10-CM

## 2023-10-13 DIAGNOSIS — I10 HYPERTENSION, UNSPECIFIED TYPE: ICD-10-CM

## 2023-10-13 DIAGNOSIS — I25.10 CORONARY ARTERY DISEASE INVOLVING NATIVE HEART, UNSPECIFIED VESSEL OR LESION TYPE, UNSPECIFIED WHETHER ANGINA PRESENT: ICD-10-CM

## 2023-10-13 RX ORDER — CLOPIDOGREL BISULFATE 75 MG/1
TABLET ORAL
Qty: 30 TABLET | Refills: 0 | Status: SHIPPED | OUTPATIENT
Start: 2023-10-13 | End: 2023-11-08

## 2023-10-13 RX ORDER — LOSARTAN POTASSIUM 50 MG/1
TABLET ORAL
Qty: 120 TABLET | Refills: 0 | Status: SHIPPED | OUTPATIENT
Start: 2023-10-13 | End: 2023-12-11

## 2023-10-13 RX ORDER — SERTRALINE HYDROCHLORIDE 50 MG/1
50 TABLET, FILM COATED ORAL DAILY
Qty: 30 TABLET | Refills: 0 | Status: SHIPPED | OUTPATIENT
Start: 2023-10-13 | End: 2023-11-08

## 2023-10-13 RX ORDER — BUSPIRONE HYDROCHLORIDE 15 MG/1
TABLET ORAL
Qty: 60 TABLET | Refills: 0 | Status: SHIPPED | OUTPATIENT
Start: 2023-10-13 | End: 2023-11-15

## 2023-10-21 DIAGNOSIS — I15.9 SECONDARY HYPERTENSION: ICD-10-CM

## 2023-10-21 DIAGNOSIS — F51.01 PRIMARY INSOMNIA: ICD-10-CM

## 2023-10-23 RX ORDER — METOPROLOL TARTRATE 50 MG/1
50 TABLET ORAL 2 TIMES DAILY
Qty: 60 TABLET | Refills: 0 | Status: SHIPPED | OUTPATIENT
Start: 2023-10-23 | End: 2024-02-13

## 2023-10-23 RX ORDER — ZONISAMIDE 50 MG/1
CAPSULE ORAL
COMMUNITY
Start: 2012-08-29 | End: 2024-02-14

## 2023-10-23 RX ORDER — METHIMAZOLE 5 MG/1
TABLET ORAL
COMMUNITY
Start: 2009-08-21 | End: 2024-02-14

## 2023-10-23 RX ORDER — LORAZEPAM 1 MG/1
TABLET ORAL
COMMUNITY
Start: 2009-08-21 | End: 2024-02-14

## 2023-10-23 RX ORDER — PROMETHAZINE HYDROCHLORIDE 12.5 MG/1
TABLET ORAL
COMMUNITY
Start: 2009-09-25 | End: 2024-02-14

## 2023-10-23 RX ORDER — ZOLPIDEM TARTRATE 5 MG/1
5 TABLET ORAL NIGHTLY
Qty: 90 TABLET | Refills: 0 | Status: SHIPPED | OUTPATIENT
Start: 2023-10-23 | End: 2024-01-22 | Stop reason: SDUPTHER

## 2023-11-08 DIAGNOSIS — I25.10 CORONARY ARTERY DISEASE INVOLVING NATIVE HEART, UNSPECIFIED VESSEL OR LESION TYPE, UNSPECIFIED WHETHER ANGINA PRESENT: ICD-10-CM

## 2023-11-08 DIAGNOSIS — F32.A ANXIETY AND DEPRESSION: ICD-10-CM

## 2023-11-08 DIAGNOSIS — R42 CHRONIC VERTIGO: ICD-10-CM

## 2023-11-08 DIAGNOSIS — F41.9 ANXIETY AND DEPRESSION: ICD-10-CM

## 2023-11-08 RX ORDER — CLOPIDOGREL BISULFATE 75 MG/1
TABLET ORAL
Qty: 30 TABLET | Refills: 5 | Status: SHIPPED | OUTPATIENT
Start: 2023-11-08 | End: 2024-02-14 | Stop reason: SDUPTHER

## 2023-11-08 RX ORDER — MECLIZINE HYDROCHLORIDE 25 MG/1
25 TABLET ORAL 3 TIMES DAILY PRN
Qty: 30 TABLET | Refills: 0 | Status: SHIPPED | OUTPATIENT
Start: 2023-11-08 | End: 2024-01-23

## 2023-11-08 RX ORDER — SERTRALINE HYDROCHLORIDE 50 MG/1
50 TABLET, FILM COATED ORAL DAILY
Qty: 30 TABLET | Refills: 5 | Status: SHIPPED | OUTPATIENT
Start: 2023-11-08 | End: 2024-05-13

## 2023-11-08 NOTE — TELEPHONE ENCOUNTER
Rx Refill Request Telephone Encounter    Name:  Amy Alexander  :  [id number]  Medication Name:  Clopidogrel, Sertraline, and meclizine refill request.

## 2023-11-15 DIAGNOSIS — F32.A ANXIETY AND DEPRESSION: ICD-10-CM

## 2023-11-15 DIAGNOSIS — F41.9 ANXIETY AND DEPRESSION: ICD-10-CM

## 2023-11-15 RX ORDER — BUSPIRONE HYDROCHLORIDE 15 MG/1
TABLET ORAL
Qty: 60 TABLET | Refills: 5 | Status: SHIPPED | OUTPATIENT
Start: 2023-11-15 | End: 2024-05-13

## 2023-12-10 DIAGNOSIS — I10 HYPERTENSION, UNSPECIFIED TYPE: ICD-10-CM

## 2023-12-11 RX ORDER — LOSARTAN POTASSIUM 50 MG/1
50 TABLET ORAL 2 TIMES DAILY
Qty: 180 TABLET | Refills: 1 | Status: SHIPPED | OUTPATIENT
Start: 2023-12-11 | End: 2024-06-10

## 2023-12-11 RX ORDER — ASPIRIN 325 MG
1 TABLET, DELAYED RELEASE (ENTERIC COATED) ORAL DAILY
COMMUNITY
Start: 2009-12-07

## 2024-01-22 DIAGNOSIS — F51.01 PRIMARY INSOMNIA: ICD-10-CM

## 2024-01-22 RX ORDER — ZOLPIDEM TARTRATE 5 MG/1
5 TABLET ORAL NIGHTLY
Qty: 90 TABLET | Refills: 0 | Status: SHIPPED | OUTPATIENT
Start: 2024-01-22 | End: 2024-04-18

## 2024-01-22 NOTE — TELEPHONE ENCOUNTER
Pt called request refill for ambien, pt is completely out pt states she request 5 days ago.  [id number] - [place], [place] - [address] Phone: [phone]   Fax: [phone]

## 2024-01-23 DIAGNOSIS — R42 CHRONIC VERTIGO: ICD-10-CM

## 2024-01-23 RX ORDER — MECLIZINE HYDROCHLORIDE 25 MG/1
25 TABLET ORAL 3 TIMES DAILY PRN
Qty: 30 TABLET | Refills: 1 | Status: SHIPPED | OUTPATIENT
Start: 2024-01-23

## 2024-02-12 ENCOUNTER — TELEPHONE (OUTPATIENT)
Dept: PRIMARY CARE | Facility: CLINIC | Age: 89
End: 2024-02-12
Payer: MEDICARE

## 2024-02-13 DIAGNOSIS — I15.9 SECONDARY HYPERTENSION: ICD-10-CM

## 2024-02-13 RX ORDER — METOPROLOL TARTRATE 50 MG/1
50 TABLET ORAL 2 TIMES DAILY
Qty: 60 TABLET | Refills: 3 | Status: SHIPPED | OUTPATIENT
Start: 2024-02-13 | End: 2024-06-11

## 2024-02-14 ENCOUNTER — OFFICE VISIT (OUTPATIENT)
Dept: PRIMARY CARE | Facility: CLINIC | Age: 89
End: 2024-02-14
Payer: MEDICARE

## 2024-02-14 VITALS — HEART RATE: 76 BPM | SYSTOLIC BLOOD PRESSURE: 149 MMHG | DIASTOLIC BLOOD PRESSURE: 84 MMHG | OXYGEN SATURATION: 96 %

## 2024-02-14 DIAGNOSIS — F32.A ANXIETY AND DEPRESSION: ICD-10-CM

## 2024-02-14 DIAGNOSIS — N39.0 URINARY TRACT INFECTION WITHOUT HEMATURIA, SITE UNSPECIFIED: ICD-10-CM

## 2024-02-14 DIAGNOSIS — I10 HYPERTENSION, UNSPECIFIED TYPE: ICD-10-CM

## 2024-02-14 DIAGNOSIS — Z00.00 ROUTINE GENERAL MEDICAL EXAMINATION AT HEALTH CARE FACILITY: Primary | ICD-10-CM

## 2024-02-14 DIAGNOSIS — N18.31 STAGE 3A CHRONIC KIDNEY DISEASE (MULTI): ICD-10-CM

## 2024-02-14 DIAGNOSIS — F41.9 ANXIETY AND DEPRESSION: ICD-10-CM

## 2024-02-14 DIAGNOSIS — E78.49 OTHER HYPERLIPIDEMIA: ICD-10-CM

## 2024-02-14 DIAGNOSIS — I25.10 CORONARY ARTERY DISEASE INVOLVING NATIVE HEART, UNSPECIFIED VESSEL OR LESION TYPE, UNSPECIFIED WHETHER ANGINA PRESENT: ICD-10-CM

## 2024-02-14 DIAGNOSIS — H91.90 HEARING LOSS, UNSPECIFIED HEARING LOSS TYPE, UNSPECIFIED LATERALITY: ICD-10-CM

## 2024-02-14 LAB
POC APPEARANCE, URINE: ABNORMAL
POC BILIRUBIN, URINE: NEGATIVE
POC BLOOD, URINE: NEGATIVE
POC COLOR, URINE: ABNORMAL
POC GLUCOSE, URINE: ABNORMAL MG/DL
POC KETONES, URINE: NEGATIVE MG/DL
POC LEUKOCYTES, URINE: NEGATIVE
POC NITRITE,URINE: POSITIVE
POC PH, URINE: 6 PH
POC PROTEIN, URINE: ABNORMAL MG/DL
POC SPECIFIC GRAVITY, URINE: 1.02
POC UROBILINOGEN, URINE: 1 EU/DL

## 2024-02-14 PROCEDURE — 3079F DIAST BP 80-89 MM HG: CPT | Performed by: FAMILY MEDICINE

## 2024-02-14 PROCEDURE — G0439 PPPS, SUBSEQ VISIT: HCPCS | Performed by: FAMILY MEDICINE

## 2024-02-14 PROCEDURE — 3077F SYST BP >= 140 MM HG: CPT | Performed by: FAMILY MEDICINE

## 2024-02-14 PROCEDURE — 1159F MED LIST DOCD IN RCRD: CPT | Performed by: FAMILY MEDICINE

## 2024-02-14 PROCEDURE — 87086 URINE CULTURE/COLONY COUNT: CPT

## 2024-02-14 PROCEDURE — 99214 OFFICE O/P EST MOD 30 MIN: CPT | Performed by: FAMILY MEDICINE

## 2024-02-14 PROCEDURE — 81003 URINALYSIS AUTO W/O SCOPE: CPT | Performed by: FAMILY MEDICINE

## 2024-02-14 PROCEDURE — 1036F TOBACCO NON-USER: CPT | Performed by: FAMILY MEDICINE

## 2024-02-14 PROCEDURE — 1158F ADVNC CARE PLAN TLK DOCD: CPT | Performed by: FAMILY MEDICINE

## 2024-02-14 PROCEDURE — 1170F FXNL STATUS ASSESSED: CPT | Performed by: FAMILY MEDICINE

## 2024-02-14 PROCEDURE — 1160F RVW MEDS BY RX/DR IN RCRD: CPT | Performed by: FAMILY MEDICINE

## 2024-02-14 PROCEDURE — 1123F ACP DISCUSS/DSCN MKR DOCD: CPT | Performed by: FAMILY MEDICINE

## 2024-02-14 PROCEDURE — 1157F ADVNC CARE PLAN IN RCRD: CPT | Performed by: FAMILY MEDICINE

## 2024-02-14 RX ORDER — NITROFURANTOIN 25; 75 MG/1; MG/1
100 CAPSULE ORAL 2 TIMES DAILY
Qty: 14 CAPSULE | Refills: 0 | Status: SHIPPED | OUTPATIENT
Start: 2024-02-14 | End: 2024-02-21

## 2024-02-14 RX ORDER — NITROGLYCERIN 0.4 MG/1
0.4 TABLET SUBLINGUAL EVERY 5 MIN PRN
Qty: 90 TABLET | Refills: 0 | Status: SHIPPED | OUTPATIENT
Start: 2024-02-14

## 2024-02-14 RX ORDER — CLOPIDOGREL BISULFATE 75 MG/1
75 TABLET ORAL DAILY
Qty: 90 TABLET | Refills: 3 | Status: SHIPPED | OUTPATIENT
Start: 2024-02-14

## 2024-02-14 ASSESSMENT — ACTIVITIES OF DAILY LIVING (ADL)
DOING_HOUSEWORK: INDEPENDENT
GROCERY_SHOPPING: TOTAL CARE
TAKING_MEDICATION: NEEDS ASSISTANCE
MANAGING_FINANCES: TOTAL CARE
DRESSING: INDEPENDENT
BATHING: INDEPENDENT

## 2024-02-14 ASSESSMENT — ENCOUNTER SYMPTOMS
PALPITATIONS: 0
NECK PAIN: 1
DIZZINESS: 0
DYSURIA: 0
HEADACHES: 0
FEVER: 0
FREQUENCY: 1
COUGH: 0
SHORTNESS OF BREATH: 0
SLEEP DISTURBANCE: 1

## 2024-02-14 ASSESSMENT — PATIENT HEALTH QUESTIONNAIRE - PHQ9
2. FEELING DOWN, DEPRESSED OR HOPELESS: NOT AT ALL
1. LITTLE INTEREST OR PLEASURE IN DOING THINGS: NOT AT ALL
SUM OF ALL RESPONSES TO PHQ9 QUESTIONS 1 AND 2: 0

## 2024-02-14 NOTE — PROGRESS NOTES
Subjective   Patient ID: Amy Alexander is a 89 y.o. female who presents for UTI (Was given azo 2/13 and 2/14) and Annual Exam (Pt has active advanced directives. ).    UTI   This is a new problem. The current episode started 1 to 4 weeks ago. Associated symptoms include frequency and urgency.      She is here today for possible UTI, 6-month follow-up and also annual Medicare wellness visit  She has had a 3-week history of urinary frequency and urgency.  No dysuria.  No abdominal pain, vomiting, or flank pain.  Took Azo with improvement.  No recent antibiotics  She has a history of hypertension currently taking losartan 50 mg twice daily metoprolol 50 mg twice daily.  She had previously been taking HCTZ 25 mg, but stopped this medication 1 week ago due to urinary frequency  No dizziness  She is taking atorvastatin 20 mg daily for hyperlipidemia.  Last lipid panel done 2/24/2022 showed total cholesterol 152 and LDL 66  She is following with cardiology annually due to history of CAD with history of MI and 3 stents 2/2019.  Taking aspirin and Plavix.  Has nitroglycerin as needed but has not had to use.  Denies any chest pain or shortness of breath  She is taking Ambien 5 mg as needed for insomnia.  This works very well for her.  She sleeps 8 hours when taking.  No trouble falling or staying asleep.  No adverse effects.  Last CSA and urine tox screen were done 8/14/2023  Takes sertraline and buspirone for anxiety/depression and feels that it has been working well for her    OARRS:  Lionel Little, DO on 2/14/2024 10:49 AM  I have personally reviewed the OARRS report for Amy Alexander. I have considered the risks of abuse, dependence, addiction and diversion    Is the patient prescribed a combination of a benzodiazepine and opioid?  No    Last Urine Drug Screen / ordered today: Yes  Recent Results (from the past 8760 hour(s))   OPIATE/OPIOID/BENZO PRESCRIPTION COMPLIANCE    Collection Time: 08/14/23  3:10 PM   Result Value Ref  Range    DRUG SCREEN COMMENT URINE SEE BELOW     Creatine, Urine 75.5 mg/dL    Amphetamine Screen, Urine PRESUMPTIVE NEGATIVE NEGATIVE    Barbiturate Screen, Urine PRESUMPTIVE NEGATIVE NEGATIVE    Cannabinoid Screen, Urine PRESUMPTIVE NEGATIVE NEGATIVE    Cocaine Screen, Urine PRESUMPTIVE NEGATIVE NEGATIVE    PCP Screen, Urine PRESUMPTIVE NEGATIVE NEGATIVE    7-Aminoclonazepam <25 Cutoff <25 ng/mL    Alpha-Hydroxyalprazolam <25 Cutoff <25 ng/mL    Alpha-Hydroxymidazolam <25 Cutoff <25 ng/mL    Alprazolam <25 Cutoff <25 ng/mL    Chlordiazepoxide <25 Cutoff <25 ng/mL    Clonazepam <25 Cutoff <25 ng/mL    Diazepam <25 Cutoff <25 ng/mL    Lorazepam <25 Cutoff <25 ng/mL    Midazolam <25 Cutoff <25 ng/mL    Nordiazepam <25 Cutoff <25 ng/mL    Oxazepam <25 Cutoff <25 ng/mL    Temazepam <25 Cutoff <25 ng/mL    Zolpidem <25 Cutoff <25 ng/mL    Zolpidem Metabolite (ZCA) >1000 (A) Cutoff <25 ng/mL    6-Acetylmorphine <25 Cutoff <25 ng/mL    Codeine <50 Cutoff <50 ng/mL    Hydrocodone <25 Cutoff <25 ng/mL    Hydromorphone <25 Cutoff <25 ng/mL    Morphine Urine <50 Cutoff <50 ng/mL    Norhydrocodone <25 Cutoff <25 ng/mL    Noroxycodone <25 Cutoff <25 ng/mL    Oxycodone <25 Cutoff <25 ng/mL    Oxymorphone <25 Cutoff <25 ng/mL    Tramadol <50 Cutoff <50 ng/mL    O-Desmethyltramadol <50 Cutoff <50 ng/mL    Fentanyl <2.5 Cutoff<2.5 ng/mL    Norfentanyl <2.5 Cutoff<2.5 ng/mL    METHADONE CONFIRMATION,URINE <25 Cutoff <25 ng/mL    EDDP <25 Cutoff <25 ng/mL     Results are as expected.         Controlled Substance Agreement:  Date of the Last Agreement: 8/14/23  Reviewed Controlled Substance Agreement including but not limited to the benefits, risks, and alternatives to treatment with a Controlled Substance medication(s).    Sleep Aids:   What is the patient's goal of therapy?  Restful sleep  Is this being achieved with current treatment?  Yes    Activities of Daily Living:   Is your overall impression that this patient is benefiting  (symptom reduction outweighs side effects) from sleep aid therapy? Yes     1. Physical Functioning: Better  5. Sleep Patterns: Better  6. Overall Function: Better    Review of Systems   Constitutional:  Negative for fever.   HENT:  Positive for hearing loss (Over the past year).    Respiratory:  Negative for cough and shortness of breath.    Cardiovascular:  Negative for chest pain, palpitations and leg swelling.   Genitourinary:  Positive for frequency and urgency. Negative for dysuria.   Musculoskeletal:  Positive for neck pain.   Neurological:  Negative for dizziness and headaches.   Psychiatric/Behavioral:  Positive for sleep disturbance.        Objective   /84   Pulse 76   SpO2 96%     Physical Exam  Vitals reviewed.   Constitutional:       General: She is not in acute distress.     Appearance: Normal appearance. She is well-developed.   HENT:      Head: Normocephalic.      Right Ear: Tympanic membrane, ear canal and external ear normal.      Left Ear: Tympanic membrane, ear canal and external ear normal.   Eyes:      Conjunctiva/sclera: Conjunctivae normal.   Neck:      Thyroid: No thyromegaly.      Vascular: No JVD.   Cardiovascular:      Rate and Rhythm: Normal rate and regular rhythm.      Heart sounds: Normal heart sounds.   Pulmonary:      Effort: Pulmonary effort is normal.      Breath sounds: Normal breath sounds.   Abdominal:      Palpations: Abdomen is soft.      Tenderness: There is no abdominal tenderness. There is no right CVA tenderness or left CVA tenderness.   Musculoskeletal:         General: Normal range of motion.      Right lower leg: No edema.      Left lower leg: No edema.   Lymphadenopathy:      Cervical: No cervical adenopathy.   Skin:     Findings: No rash.   Neurological:      Mental Status: She is alert and oriented to person, place, and time.   Psychiatric:         Mood and Affect: Mood normal.         Behavior: Behavior normal.         Assessment/Plan   Problem List Items  Addressed This Visit          Medium    Anxiety and depression    CAD (coronary artery disease)    Relevant Medications    clopidogrel (Plavix) 75 mg tablet    nitroglycerin (Nitrostat) 0.4 mg SL tablet    Hypertension    Other hyperlipidemia    Stage 3a chronic kidney disease (CMS/HCC)     Repeat labs including kidney function have been ordered          Other Visit Diagnoses       Routine general medical examination at health care facility    -  Primary    Urinary tract infection without hematuria, site unspecified        Relevant Medications    nitrofurantoin, macrocrystal-monohydrate, (Macrobid) 100 mg capsule    Other Relevant Orders    POCT UA Automated manually resulted (Completed)    Urine Culture    Hearing loss, unspecified hearing loss type, unspecified laterality        Relevant Orders    Referral to ENT        #1 preventative health  Healthy diet and regular exercise is recommended  Up-to-date on pneumococcal and influenza vaccines  DEXA scan was ordered at last visit for osteoporosis screening, recommended getting done in the next several months  She had difficulty hearing out of her left ear on audiometry today.  Refer to ENT to discuss further  2.  Acute UTI: UA today is positive for UTI.  Will start treatment with Macrobid twice daily x 7 days and send urine for culture.  Advised ER if any fever or worsening symptoms.  Follow-up in 1 week if symptoms not resolved  3.  Hypertension: Blood pressure today is 149/84, which is acceptable given her age.  Continue current medications and follow-up in 6 months  4.  CAD: Stable, continue to follow with cardiology.  Continue current medications including aspirin and Plavix  #5 insomnia: This has been stable and adequately controlled on Ambien 5 mg nightly.  She is up-to-date on CSA and urine tox screening.  Continue at current dose and follow-up in 6 months for recheck.  Adverse effects have been discussed previously  An OARRS report was printed and I have  personally reviewed the results.  The report will be scanned into the health record.  I have considered the risk of abuse, dependance, addiction, and diversion.  I believe it is clinically appropriate for this patient to continue taking this medication.  #6 hyperlipidemia: Last LDL was at goal.  Continue atorvastatin.  Labs including lipid panel have been ordered.  Anxiety depression has been adequately controlled on sertraline and buspirone will continue at current dose  Follow-up in 6 months for recheck

## 2024-02-15 LAB — BACTERIA UR CULT: NORMAL

## 2024-04-18 DIAGNOSIS — F51.01 PRIMARY INSOMNIA: ICD-10-CM

## 2024-04-18 RX ORDER — ZOLPIDEM TARTRATE 5 MG/1
5 TABLET ORAL NIGHTLY
Qty: 90 TABLET | Refills: 0 | Status: SHIPPED | OUTPATIENT
Start: 2024-04-18

## 2024-05-13 DIAGNOSIS — F32.A ANXIETY AND DEPRESSION: ICD-10-CM

## 2024-05-13 DIAGNOSIS — F41.9 ANXIETY AND DEPRESSION: ICD-10-CM

## 2024-05-13 RX ORDER — SERTRALINE HYDROCHLORIDE 50 MG/1
50 TABLET, FILM COATED ORAL DAILY
Qty: 30 TABLET | Refills: 0 | Status: SHIPPED | OUTPATIENT
Start: 2024-05-13 | End: 2024-06-10

## 2024-05-13 RX ORDER — BUSPIRONE HYDROCHLORIDE 15 MG/1
TABLET ORAL
Qty: 60 TABLET | Refills: 0 | Status: SHIPPED | OUTPATIENT
Start: 2024-05-13 | End: 2024-06-10

## 2024-06-10 DIAGNOSIS — F41.9 ANXIETY AND DEPRESSION: ICD-10-CM

## 2024-06-10 DIAGNOSIS — I10 HYPERTENSION, UNSPECIFIED TYPE: ICD-10-CM

## 2024-06-10 DIAGNOSIS — F32.A ANXIETY AND DEPRESSION: ICD-10-CM

## 2024-06-10 RX ORDER — LOSARTAN POTASSIUM 50 MG/1
50 TABLET ORAL 2 TIMES DAILY
Qty: 180 TABLET | Refills: 3 | Status: SHIPPED | OUTPATIENT
Start: 2024-06-10

## 2024-06-10 RX ORDER — BUSPIRONE HYDROCHLORIDE 15 MG/1
TABLET ORAL
Qty: 180 TABLET | Refills: 3 | Status: SHIPPED | OUTPATIENT
Start: 2024-06-10

## 2024-06-10 RX ORDER — SERTRALINE HYDROCHLORIDE 50 MG/1
50 TABLET, FILM COATED ORAL DAILY
Qty: 90 TABLET | Refills: 3 | Status: SHIPPED | OUTPATIENT
Start: 2024-06-10

## 2024-06-11 DIAGNOSIS — I15.9 SECONDARY HYPERTENSION: ICD-10-CM

## 2024-06-11 RX ORDER — METOPROLOL TARTRATE 50 MG/1
50 TABLET ORAL 2 TIMES DAILY
Qty: 60 TABLET | Refills: 11 | Status: SHIPPED | OUTPATIENT
Start: 2024-06-11

## 2024-07-15 DIAGNOSIS — F51.01 PRIMARY INSOMNIA: ICD-10-CM

## 2024-07-15 RX ORDER — ZOLPIDEM TARTRATE 5 MG/1
5 TABLET ORAL NIGHTLY
Qty: 90 TABLET | Refills: 0 | Status: SHIPPED | OUTPATIENT
Start: 2024-07-15

## 2024-08-16 DIAGNOSIS — E78.49 OTHER HYPERLIPIDEMIA: ICD-10-CM

## 2024-08-19 RX ORDER — ATORVASTATIN CALCIUM 20 MG/1
20 TABLET, FILM COATED ORAL DAILY
Qty: 90 TABLET | Refills: 3 | Status: SHIPPED | OUTPATIENT
Start: 2024-08-19

## 2024-08-20 ENCOUNTER — TELEPHONE (OUTPATIENT)
Dept: PRIMARY CARE | Facility: CLINIC | Age: 89
End: 2024-08-20

## 2024-08-20 ENCOUNTER — APPOINTMENT (OUTPATIENT)
Dept: PRIMARY CARE | Facility: CLINIC | Age: 89
End: 2024-08-20
Payer: MEDICARE

## 2024-08-20 VITALS
HEART RATE: 59 BPM | TEMPERATURE: 97.6 F | BODY MASS INDEX: 24.8 KG/M2 | WEIGHT: 127 LBS | DIASTOLIC BLOOD PRESSURE: 81 MMHG | SYSTOLIC BLOOD PRESSURE: 136 MMHG | OXYGEN SATURATION: 94 %

## 2024-08-20 DIAGNOSIS — M54.9 CHRONIC BACK PAIN, UNSPECIFIED BACK LOCATION, UNSPECIFIED BACK PAIN LATERALITY: Primary | ICD-10-CM

## 2024-08-20 DIAGNOSIS — G47.00 INSOMNIA, UNSPECIFIED TYPE: ICD-10-CM

## 2024-08-20 DIAGNOSIS — I25.10 CORONARY ARTERY DISEASE INVOLVING NATIVE HEART, UNSPECIFIED VESSEL OR LESION TYPE, UNSPECIFIED WHETHER ANGINA PRESENT: ICD-10-CM

## 2024-08-20 DIAGNOSIS — Z79.899 HIGH RISK MEDICATION USE: ICD-10-CM

## 2024-08-20 DIAGNOSIS — F41.9 ANXIETY AND DEPRESSION: ICD-10-CM

## 2024-08-20 DIAGNOSIS — E78.49 OTHER HYPERLIPIDEMIA: ICD-10-CM

## 2024-08-20 DIAGNOSIS — F32.A ANXIETY AND DEPRESSION: ICD-10-CM

## 2024-08-20 DIAGNOSIS — I10 HYPERTENSION, UNSPECIFIED TYPE: ICD-10-CM

## 2024-08-20 DIAGNOSIS — G89.29 CHRONIC BACK PAIN, UNSPECIFIED BACK LOCATION, UNSPECIFIED BACK PAIN LATERALITY: Primary | ICD-10-CM

## 2024-08-20 DIAGNOSIS — Z00.00 ROUTINE HEALTH MAINTENANCE: Primary | ICD-10-CM

## 2024-08-20 DIAGNOSIS — E05.90 HYPERTHYROIDISM: ICD-10-CM

## 2024-08-20 PROCEDURE — 3079F DIAST BP 80-89 MM HG: CPT | Performed by: FAMILY MEDICINE

## 2024-08-20 PROCEDURE — 80365 DRUG SCREENING OXYCODONE: CPT

## 2024-08-20 PROCEDURE — 80368 SEDATIVE HYPNOTICS: CPT

## 2024-08-20 PROCEDURE — 99214 OFFICE O/P EST MOD 30 MIN: CPT | Performed by: FAMILY MEDICINE

## 2024-08-20 PROCEDURE — 80346 BENZODIAZEPINES1-12: CPT

## 2024-08-20 PROCEDURE — 80307 DRUG TEST PRSMV CHEM ANLYZR: CPT

## 2024-08-20 PROCEDURE — 1157F ADVNC CARE PLAN IN RCRD: CPT | Performed by: FAMILY MEDICINE

## 2024-08-20 PROCEDURE — 1159F MED LIST DOCD IN RCRD: CPT | Performed by: FAMILY MEDICINE

## 2024-08-20 PROCEDURE — 3075F SYST BP GE 130 - 139MM HG: CPT | Performed by: FAMILY MEDICINE

## 2024-08-20 PROCEDURE — 1160F RVW MEDS BY RX/DR IN RCRD: CPT | Performed by: FAMILY MEDICINE

## 2024-08-20 PROCEDURE — 1036F TOBACCO NON-USER: CPT | Performed by: FAMILY MEDICINE

## 2024-08-20 PROCEDURE — 80373 DRUG SCREENING TRAMADOL: CPT

## 2024-08-20 PROCEDURE — 80358 DRUG SCREENING METHADONE: CPT

## 2024-08-20 PROCEDURE — 80354 DRUG SCREENING FENTANYL: CPT

## 2024-08-20 PROCEDURE — 80361 OPIATES 1 OR MORE: CPT

## 2024-08-20 PROCEDURE — 82570 ASSAY OF URINE CREATININE: CPT

## 2024-08-20 PROCEDURE — G2211 COMPLEX E/M VISIT ADD ON: HCPCS | Performed by: FAMILY MEDICINE

## 2024-08-20 ASSESSMENT — ENCOUNTER SYMPTOMS
FEVER: 0
COUGH: 0
DIZZINESS: 0
ARTHRALGIAS: 1
HEADACHES: 0

## 2024-08-20 NOTE — ASSESSMENT & PLAN NOTE
Orders:    CBC; Future    Comprehensive Metabolic Panel; Future    Lipid Panel; Future    Thyroid Stimulating Hormone; Future    Thyroxine, Free; Future    Triiodothyronine, Free; Future    Opiate/Opioid/Benzo Prescription Compliance    OOB Internal Tracking

## 2024-08-20 NOTE — PROGRESS NOTES
Subjective   Patient ID: Amy Alexander is a 89 y.o. female who presents for Hyperlipidemia and Insomnia.    HPI     Here today for 6-month follow-up  Hypertension: Currently takes losartan 50 mg twice daily and metoprolol 50 mg twice daily.  Blood pressure typically is in the 130s over 80s when checking out of the office.  No lightheadedness  She has been working on staying active and has a balanced diet  Takes atorvastatin 20 mg daily for hyperlipidemia.  Last lipid panel checked February 2022 showed LDL 66  She follows with cardiology annually for CAD with history of MI and 3 stents placed 2/2019.  On aspirin and Plavix.  Denies chest pain  She has a history of insomnia currently taking Ambien 5 mg nightly.  This medication works very well for her.  She falls asleep right away and does not wake up during the night.  She sleeps 7 to 8 hours each night.  No adverse effects including drowsiness or grogginess  Takes sertraline and buspirone for anxiety depression.  This is working well and she has not had any recent anxiety or depression symptoms      OARRS:  Lionel Little DO on 8/20/2024  6:58 AM  I have personally reviewed the OARRS report for Amy Alexander. I have considered the risks of abuse, dependence, addiction and diversion    Is the patient prescribed a combination of a benzodiazepine and opioid?  No    Last Urine Drug Screen / ordered today: Yes  No results found for this or any previous visit (from the past 8760 hour(s)).  Results are as expected.         Controlled Substance Agreement:  Date of the Last Agreement: today  Reviewed Controlled Substance Agreement including but not limited to the benefits, risks, and alternatives to treatment with a Controlled Substance medication(s).    Sleep Aids:   What is the patient's goal of therapy? Restful sleep  Is this being achieved with current treatment? yes    Activities of Daily Living:   Is your overall impression that this patient is benefiting (symptom reduction  outweighs side effects) from sleep aid therapy? Yes     1. Physical Functioning: Better  Mood: Better  5. Sleep Patterns: Better  6. Overall Function: Better          Review of Systems   Constitutional:  Negative for fever.   Respiratory:  Negative for cough.    Cardiovascular:  Negative for chest pain and leg swelling.   Musculoskeletal:  Positive for arthralgias.   Neurological:  Negative for dizziness and headaches.       Objective   /81   Pulse 59   Temp 36.4 °C (97.6 °F) (Temporal)   Wt 57.6 kg (127 lb)   SpO2 94%   BMI 24.80 kg/m²     Physical Exam  Vitals reviewed.   Constitutional:       General: She is not in acute distress.     Appearance: Normal appearance. She is well-developed.   HENT:      Head: Normocephalic.   Eyes:      Conjunctiva/sclera: Conjunctivae normal.   Cardiovascular:      Rate and Rhythm: Normal rate and regular rhythm.      Heart sounds: Normal heart sounds.   Pulmonary:      Effort: Pulmonary effort is normal.      Breath sounds: Normal breath sounds.   Musculoskeletal:      Right lower leg: No edema.      Left lower leg: No edema.   Skin:     Findings: No rash.   Neurological:      Mental Status: She is alert.   Psychiatric:         Mood and Affect: Mood normal.         Behavior: Behavior normal.         Assessment/Plan   Assessment & Plan  Routine health maintenance    Orders:    CBC; Future    Comprehensive Metabolic Panel; Future    Lipid Panel; Future    Thyroid Stimulating Hormone; Future    Thyroxine, Free; Future    Triiodothyronine, Free; Future    Opiate/Opioid/Benzo Prescription Compliance    OOB Internal Tracking    Hyperthyroidism    Orders:    CBC; Future    Comprehensive Metabolic Panel; Future    Lipid Panel; Future    Thyroid Stimulating Hormone; Future    Thyroxine, Free; Future    Triiodothyronine, Free; Future    Opiate/Opioid/Benzo Prescription Compliance    OOB Internal Tracking    Coronary artery disease involving native heart, unspecified vessel or  lesion type, unspecified whether angina present    Orders:    CBC; Future    Comprehensive Metabolic Panel; Future    Lipid Panel; Future    Thyroid Stimulating Hormone; Future    Thyroxine, Free; Future    Triiodothyronine, Free; Future    Opiate/Opioid/Benzo Prescription Compliance    OOB Internal Tracking    High risk medication use    Orders:    Opiate/Opioid/Benzo Prescription Compliance    OOB Internal Tracking    Insomnia, unspecified type         Anxiety and depression         Other hyperlipidemia         Hypertension, unspecified type         Hypertension: This is stable and controlled with blood pressure 136/81.  Continue losartan and metoprolol current dose  Hyperlipidemia: She has not had a lipid panel checked in the past year.  We will continue atorvastatin and recheck lipid panel  Continue to follow with cardiology annually for CAD with history of MI in the past.  This has been stable and she denies any chest pain.  Continue current medications including aspirin, Plavix and atorvastatin  Insomnia: This has remained stable and adequately controlled on Ambien 5 mg nightly.  Today we had her sign a CSA and obtained a urine tox screen.  OARRS reviewed.  I am aware that she is currently following with pain management and they have been prescribing hydrocodone/acetaminophen for her (this medication was started after I had started her on Ambien).  We discussed this and she specifically denies any drowsiness or other significant adverse effects when she is taking Ambien  An OARRS report was printed and I have personally reviewed the results.  The report will be scanned into the health record.  I have considered the risk of abuse, dependance, addiction, and diversion.  I believe it is clinically appropriate for this patient to continue taking this medication.  Anxiety depression has been adequately controlled on sertraline and buspirone.  Continue current dose  Follow-up in 6 months for recheck and annual  wellness visit  She does have a history of hyperthyroidism which had been diagnosed in the past.  We will check a TSH and free T3 and T4

## 2024-08-21 LAB
AMPHETAMINES UR QL SCN: NORMAL
BARBITURATES UR QL SCN: NORMAL
BZE UR QL SCN: NORMAL
CANNABINOIDS UR QL SCN: NORMAL
CREAT UR-MCNC: 124.1 MG/DL (ref 20–320)
PCP UR QL SCN: NORMAL

## 2024-08-23 LAB
1OH-MIDAZOLAM UR CFM-MCNC: <25 NG/ML
6MAM UR CFM-MCNC: <25 NG/ML
7AMINOCLONAZEPAM UR CFM-MCNC: <25 NG/ML
A-OH ALPRAZ UR CFM-MCNC: <25 NG/ML
ALPRAZ UR CFM-MCNC: <25 NG/ML
CHLORDIAZEP UR CFM-MCNC: <25 NG/ML
CLONAZEPAM UR CFM-MCNC: <25 NG/ML
CODEINE UR CFM-MCNC: <50 NG/ML
DIAZEPAM UR CFM-MCNC: <25 NG/ML
EDDP UR CFM-MCNC: <25 NG/ML
FENTANYL UR CFM-MCNC: <2.5 NG/ML
HYDROCODONE CTO UR CFM-MCNC: <25 NG/ML
HYDROMORPHONE UR CFM-MCNC: <25 NG/ML
LORAZEPAM UR CFM-MCNC: <25 NG/ML
METHADONE UR CFM-MCNC: <25 NG/ML
MIDAZOLAM UR CFM-MCNC: <25 NG/ML
MORPHINE UR CFM-MCNC: <50 NG/ML
NORDIAZEPAM UR CFM-MCNC: <25 NG/ML
NORFENTANYL UR CFM-MCNC: <2.5 NG/ML
NORHYDROCODONE UR CFM-MCNC: 76 NG/ML
NOROXYCODONE UR CFM-MCNC: <25 NG/ML
NORTRAMADOL UR-MCNC: <50 NG/ML
OXAZEPAM UR CFM-MCNC: <25 NG/ML
OXYCODONE UR CFM-MCNC: <25 NG/ML
OXYMORPHONE UR CFM-MCNC: <25 NG/ML
TEMAZEPAM UR CFM-MCNC: <25 NG/ML
TRAMADOL UR CFM-MCNC: <50 NG/ML
ZOLPIDEM UR CFM-MCNC: >1000 NG/ML
ZOLPIDEM UR-MCNC: <25 NG/ML

## 2024-10-11 DIAGNOSIS — F51.01 PRIMARY INSOMNIA: ICD-10-CM

## 2024-10-11 RX ORDER — ZOLPIDEM TARTRATE 5 MG/1
5 TABLET ORAL NIGHTLY
Qty: 90 TABLET | Refills: 0 | Status: SHIPPED | OUTPATIENT
Start: 2024-10-11

## 2024-12-19 ENCOUNTER — HOSPITAL ENCOUNTER (OUTPATIENT)
Dept: RADIOLOGY | Facility: CLINIC | Age: 89
Discharge: HOME | End: 2024-12-19
Payer: MEDICARE

## 2024-12-19 DIAGNOSIS — M25.561 PAIN IN RIGHT KNEE: ICD-10-CM

## 2024-12-19 DIAGNOSIS — M25.562 PAIN IN LEFT KNEE: ICD-10-CM

## 2024-12-19 PROCEDURE — 73560 X-RAY EXAM OF KNEE 1 OR 2: CPT | Mod: 50

## 2024-12-19 PROCEDURE — 73560 X-RAY EXAM OF KNEE 1 OR 2: CPT | Mod: BILATERAL PROCEDURE | Performed by: RADIOLOGY

## 2025-01-06 DIAGNOSIS — F51.01 PRIMARY INSOMNIA: ICD-10-CM

## 2025-01-06 RX ORDER — ZOLPIDEM TARTRATE 5 MG/1
5 TABLET ORAL NIGHTLY
Qty: 90 TABLET | Refills: 0 | Status: SHIPPED | OUTPATIENT
Start: 2025-01-06

## 2025-03-06 DIAGNOSIS — I25.10 CORONARY ARTERY DISEASE INVOLVING NATIVE HEART, UNSPECIFIED VESSEL OR LESION TYPE, UNSPECIFIED WHETHER ANGINA PRESENT: ICD-10-CM

## 2025-03-06 RX ORDER — CLOPIDOGREL BISULFATE 75 MG/1
75 TABLET ORAL DAILY
Qty: 90 TABLET | Refills: 1 | Status: SHIPPED | OUTPATIENT
Start: 2025-03-06

## 2025-04-04 DIAGNOSIS — F51.01 PRIMARY INSOMNIA: ICD-10-CM

## 2025-04-07 RX ORDER — ZOLPIDEM TARTRATE 5 MG/1
5 TABLET ORAL NIGHTLY
Qty: 90 TABLET | Refills: 0 | Status: SHIPPED | OUTPATIENT
Start: 2025-04-07

## 2025-04-23 ENCOUNTER — APPOINTMENT (OUTPATIENT)
Dept: PRIMARY CARE | Facility: CLINIC | Age: OVER 89
End: 2025-04-23
Payer: MEDICARE

## 2025-04-23 VITALS
HEART RATE: 67 BPM | OXYGEN SATURATION: 95 % | WEIGHT: 125 LBS | BODY MASS INDEX: 24.41 KG/M2 | SYSTOLIC BLOOD PRESSURE: 141 MMHG | DIASTOLIC BLOOD PRESSURE: 79 MMHG

## 2025-04-23 DIAGNOSIS — N18.31 STAGE 3A CHRONIC KIDNEY DISEASE (MULTI): ICD-10-CM

## 2025-04-23 DIAGNOSIS — F32.A ANXIETY AND DEPRESSION: ICD-10-CM

## 2025-04-23 DIAGNOSIS — G47.00 INSOMNIA, UNSPECIFIED TYPE: ICD-10-CM

## 2025-04-23 DIAGNOSIS — E05.90 HYPERTHYROIDISM: ICD-10-CM

## 2025-04-23 DIAGNOSIS — I25.10 CORONARY ARTERY DISEASE INVOLVING NATIVE HEART, UNSPECIFIED VESSEL OR LESION TYPE, UNSPECIFIED WHETHER ANGINA PRESENT: ICD-10-CM

## 2025-04-23 DIAGNOSIS — F41.9 ANXIETY AND DEPRESSION: ICD-10-CM

## 2025-04-23 DIAGNOSIS — Z13.220 LIPID SCREENING: ICD-10-CM

## 2025-04-23 DIAGNOSIS — I10 HYPERTENSION, UNSPECIFIED TYPE: ICD-10-CM

## 2025-04-23 DIAGNOSIS — E78.49 OTHER HYPERLIPIDEMIA: ICD-10-CM

## 2025-04-23 DIAGNOSIS — Z00.00 ROUTINE GENERAL MEDICAL EXAMINATION AT HEALTH CARE FACILITY: Primary | ICD-10-CM

## 2025-04-23 PROCEDURE — 3077F SYST BP >= 140 MM HG: CPT | Performed by: FAMILY MEDICINE

## 2025-04-23 PROCEDURE — 1123F ACP DISCUSS/DSCN MKR DOCD: CPT | Performed by: FAMILY MEDICINE

## 2025-04-23 PROCEDURE — G2211 COMPLEX E/M VISIT ADD ON: HCPCS | Performed by: FAMILY MEDICINE

## 2025-04-23 PROCEDURE — 1170F FXNL STATUS ASSESSED: CPT | Performed by: FAMILY MEDICINE

## 2025-04-23 PROCEDURE — 1160F RVW MEDS BY RX/DR IN RCRD: CPT | Performed by: FAMILY MEDICINE

## 2025-04-23 PROCEDURE — 3078F DIAST BP <80 MM HG: CPT | Performed by: FAMILY MEDICINE

## 2025-04-23 PROCEDURE — 1158F ADVNC CARE PLAN TLK DOCD: CPT | Performed by: FAMILY MEDICINE

## 2025-04-23 PROCEDURE — G0439 PPPS, SUBSEQ VISIT: HCPCS | Performed by: FAMILY MEDICINE

## 2025-04-23 PROCEDURE — 1157F ADVNC CARE PLAN IN RCRD: CPT | Performed by: FAMILY MEDICINE

## 2025-04-23 PROCEDURE — 1159F MED LIST DOCD IN RCRD: CPT | Performed by: FAMILY MEDICINE

## 2025-04-23 PROCEDURE — 99214 OFFICE O/P EST MOD 30 MIN: CPT | Performed by: FAMILY MEDICINE

## 2025-04-23 PROCEDURE — 1036F TOBACCO NON-USER: CPT | Performed by: FAMILY MEDICINE

## 2025-04-23 ASSESSMENT — ACTIVITIES OF DAILY LIVING (ADL)
DOING_HOUSEWORK: INDEPENDENT
DRESSING: INDEPENDENT
GROCERY_SHOPPING: TOTAL CARE
MANAGING_FINANCES: TOTAL CARE
BATHING: INDEPENDENT
TAKING_MEDICATION: INDEPENDENT

## 2025-04-23 ASSESSMENT — ENCOUNTER SYMPTOMS
FEVER: 0
DIZZINESS: 0
HEADACHES: 0
NECK PAIN: 1
SLEEP DISTURBANCE: 0
COUGH: 0
ABDOMINAL PAIN: 0

## 2025-04-23 ASSESSMENT — PATIENT HEALTH QUESTIONNAIRE - PHQ9
1. LITTLE INTEREST OR PLEASURE IN DOING THINGS: NOT AT ALL
SUM OF ALL RESPONSES TO PHQ9 QUESTIONS 1 AND 2: 0
2. FEELING DOWN, DEPRESSED OR HOPELESS: NOT AT ALL

## 2025-04-23 NOTE — ASSESSMENT & PLAN NOTE
Orders:    CBC; Future    Comprehensive Metabolic Panel; Future    Thyroid Stimulating Hormone; Future    Thyroxine, Free; Future    Triiodothyronine, Free; Future

## 2025-04-23 NOTE — PROGRESS NOTES
Subjective   Patient ID: Amy Alexander is a 89 y.o. female who presents for Annual Exam (PHQ2- negative /Fall screen- negative /Advanced care plan- active ).    HPI       She is here today for annual wellness visit and 6-month follow-up  Hypertension: Currently takes losartan 50 mg twice daily, metoprolol 50 mg twice daily, and HCTZ 25 mg daily.  Has not been checking blood pressure out of the office over the past month.  No lightheadedness or dizziness  She is taking atorvastatin 20 mg daily for hyperlipidemia.  I ordered a lipid panel this past August and they state that she got them done, however I never received the results.  Lipid panel checked prior to this in 2022 showed LDL of 66  She follows with cardiology, Dr. Mancera annually for CAD with history of MI and 3 stents in 2019.  She is taking aspirin and Plavix.  Denies any chest pain or shortness of breath  She is currently taking Ambien 5 mg nightly for insomnia.  She reports this is working very well for her.  She sleeps 5 hours when taking and feels that sleep is restful.  She denies any adverse effects including grogginess or morning drowsiness when taking  She is taking sertraline and buspirone for anxiety and depression.  Reports this is working well denies any significant anxiety or depression symptoms when taking  She follows with pain management regularly for chronic neck pain and they are currently prescribing hydrocodone/acetaminophen for her    Last CSA and urine tox screen done 8/20/2024    OARRS:  Lionel Little DO on 4/23/2025 10:46 AM  I have personally reviewed the OARRS report for Amy Alexander. I have considered the risks of abuse, dependence, addiction and diversion    Is the patient prescribed a combination of a benzodiazepine and opioid?  No    Last Urine Drug Screen / ordered today: Yes  Recent Results (from the past 8760 hours)   Confirmation Opiate/Opioid/Benzo Prescription Compliance    Collection Time: 08/20/24 10:14 AM   Result Value Ref  Range    Clonazepam <25 <25 ng/mL    7-Aminoclonazepam <25 <25 ng/mL    Alprazolam <25 <25 ng/mL    Alpha-Hydroxyalprazolam <25 <25 ng/mL    Midazolam <25 <25 ng/mL    Alpha-Hydroxymidazolam <25 <25 ng/mL    Chlordiazepoxide <25 <25 ng/mL    Diazepam <25 <25 ng/mL    Nordiazepam <25 <25 ng/mL    Temazepam <25 <25 ng/mL    Oxazepam <25 <25 ng/mL    Lorazepam <25 <25 ng/mL    Methadone <25 <25 ng/mL    EDDP <25 <25 ng/mL    6-Acetylmorphine <25 <25 ng/mL    Codeine <50 <50 ng/mL    Hydrocodone <25 <25 ng/mL    Hydromorphone <25 <25 ng/mL    Morphine  <50 <50 ng/mL    Norhydrocodone 76 (H) <25 ng/mL    Noroxycodone <25 <25 ng/mL    Oxycodone <25 <25 ng/mL    Oxymorphone <25 <25 ng/mL    Fentanyl <2.5 <2.5 ng/mL    Norfentanyl <2.5 <2.5 ng/mL    Tramadol <50 <50 ng/mL    O-Desmethyltramadol <50 <50 ng/mL    Zolpidem <25 <25 ng/mL    Zolpidem Metabolite (ZCA) >1,000 (H) <25 ng/mL   Screen Opiate/Opioid/Benzo Prescription Compliance    Collection Time: 08/20/24 10:14 AM   Result Value Ref Range    Creatinine, Urine Random 124.1 20.0 - 320.0 mg/dL    Amphetamine Screen, Urine Presumptive Negative Presumptive Negative    Barbiturate Screen, Urine Presumptive Negative Presumptive Negative    Cannabinoid Screen, Urine Presumptive Negative Presumptive Negative    Cocaine Metabolite Screen, Urine Presumptive Negative Presumptive Negative    PCP Screen, Urine Presumptive Negative Presumptive Negative     Results are as expected.         Controlled Substance Agreement:  Date of the Last Agreement: 8/20/24  Reviewed Controlled Substance Agreement including but not limited to the benefits, risks, and alternatives to treatment with a Controlled Substance medication(s).    Sleep Aids:   What is the patient's goal of therapy? Restful sleep  Is this being achieved with current treatment? yes    Activities of Daily Living:   Is your overall impression that this patient is benefiting (symptom reduction outweighs side effects) from sleep  aid therapy? Yes     1. Physical Functioning: Better  5. Sleep Patterns: Better  6. Overall Function: Better      Patient Health Questionnaire-2 Score: 0 (4/23/2025 10:20 AM)        Review of Systems   Constitutional:  Negative for fever.   HENT:  Negative for hearing loss.    Respiratory:  Negative for cough.    Cardiovascular:  Negative for chest pain.   Gastrointestinal:  Negative for abdominal pain.   Musculoskeletal:  Positive for neck pain (Follows with pain management for chronic neck pain).   Neurological:  Negative for dizziness and headaches.   Psychiatric/Behavioral:  Negative for sleep disturbance.    All other systems reviewed and are negative.      Objective   /79   Pulse 67   Wt 56.7 kg (125 lb)   SpO2 95%   BMI 24.41 kg/m²     Physical Exam  Vitals reviewed.   Constitutional:       General: She is not in acute distress.     Appearance: Normal appearance. She is well-developed.   HENT:      Head: Normocephalic.      Right Ear: Tympanic membrane, ear canal and external ear normal.      Left Ear: Tympanic membrane, ear canal and external ear normal.      Nose: Nose normal.      Mouth/Throat:      Mouth: Mucous membranes are moist.   Eyes:      Conjunctiva/sclera: Conjunctivae normal.   Neck:      Thyroid: No thyromegaly.      Vascular: No JVD.   Cardiovascular:      Rate and Rhythm: Normal rate and regular rhythm.      Heart sounds: Normal heart sounds.   Pulmonary:      Effort: Pulmonary effort is normal.      Breath sounds: Normal breath sounds.   Abdominal:      Palpations: Abdomen is soft.      Tenderness: There is no abdominal tenderness.   Musculoskeletal:      Right lower leg: No edema.      Left lower leg: No edema.   Lymphadenopathy:      Cervical: No cervical adenopathy.   Skin:     Findings: No rash.   Neurological:      Mental Status: She is alert and oriented to person, place, and time.   Psychiatric:         Mood and Affect: Mood normal.         Behavior: Behavior normal.          Assessment/Plan   Assessment & Plan  Routine general medical examination at [facility]    Orders:    1 Year Follow Up In [facility] - PCP - Wellness Exam; Future    CBC; Future    Comprehensive Metabolic Panel; Future    Thyroid Stimulating Hormone; Future    Thyroxine, Free; Future    Triiodothyronine, Free; Future    Hypertension, unspecified type    Orders:    CBC; Future    Comprehensive Metabolic Panel; Future    Thyroid Stimulating Hormone; Future    Thyroxine, Free; Future    Triiodothyronine, Free; Future    Other hyperlipidemia    Orders:    CBC; Future    Comprehensive Metabolic Panel; Future    Thyroid Stimulating Hormone; Future    Thyroxine, Free; Future    Triiodothyronine, Free; Future    Lipid screening    Orders:    CBC; Future    Comprehensive Metabolic Panel; Future    Lipid Panel; Future    Thyroid Stimulating Hormone; Future    Thyroxine, Free; Future    Triiodothyronine, Free; Future    Coronary artery disease involving native heart, unspecified vessel or lesion type, unspecified whether angina present    Orders:    CBC; Future    Comprehensive Metabolic Panel; Future    Thyroid Stimulating Hormone; Future    Thyroxine, Free; Future    Triiodothyronine, Free; Future    Stage 3a chronic kidney disease (Multi)  I could not locate any recent lab results.  Check kidney function with next labs         Hyperthyroidism    Orders:    CBC; Future    Comprehensive Metabolic Panel; Future    Thyroid Stimulating Hormone; Future    Thyroxine, Free; Future    Triiodothyronine, Free; Future    Anxiety and depression         Insomnia, unspecified type           Healthy diet and regular exercise recommended  Up-to-date on pneumococcal vaccine.  Recommended tetanus and shingles vaccines at pharmacy  Declines DEXA scan for osteoporosis screening  Hypertension: Blood pressure today is mildly elevated at 141/79.  At her age, this blood pressure reading is acceptable.  Will continue  medications including losartan, metoprolol, and HCTZ at current dose  Hyperlipidemia: Stable on atorvastatin.  We will check labs including lipid panel.  I was not able to locate any of the labs that she had done this past summer.  We will have her sign a records release to get a copy of these results.  Continue atorvastatin at current dose  CAD has been stable, continue to follow with cardiology annually and continue aspirin and Plavix  Insomnia: This has remained adequately controlled on Ambien 5 mg nightly.  She is up-to-date on urine tox screen and CSA  I am aware that she is currently following with pain management for chronic neck pain and they are prescribing hydrocodone/acetaminophen for her.  This medication has been started by pain management after I had started her on Ambien.  We have discussed this previously including the risk of interaction between these 2 medications.  She is tolerating well and has denied any adverse effects including drowsiness with these medications.  Continue to follow with pain management  An OARRS report was printed and I have personally reviewed the results.  The report will be scanned into the health record.  I have considered the risk of abuse, dependance, addiction, and diversion.  I believe it is clinically appropriate for this patient to continue taking this medication.  Anxiety and depression has been adequately controlled on sertraline and buspirone.  Continue current dose  She does have a previous diagnosis of hyperthyroidism, so we will check free T3 and free T4 along with TSH with next labs  Follow-up in 6 months for recheck

## 2025-06-02 DIAGNOSIS — F32.A ANXIETY AND DEPRESSION: ICD-10-CM

## 2025-06-02 DIAGNOSIS — F41.9 ANXIETY AND DEPRESSION: ICD-10-CM

## 2025-06-02 DIAGNOSIS — I10 HYPERTENSION, UNSPECIFIED TYPE: ICD-10-CM

## 2025-06-02 RX ORDER — LOSARTAN POTASSIUM 50 MG/1
50 TABLET ORAL 2 TIMES DAILY
Qty: 180 TABLET | Refills: 3 | Status: SHIPPED | OUTPATIENT
Start: 2025-06-02

## 2025-06-02 RX ORDER — SERTRALINE HYDROCHLORIDE 50 MG/1
50 TABLET, FILM COATED ORAL DAILY
Qty: 90 TABLET | Refills: 3 | Status: SHIPPED | OUTPATIENT
Start: 2025-06-02

## 2025-06-02 RX ORDER — BUSPIRONE HYDROCHLORIDE 15 MG/1
15 TABLET ORAL 2 TIMES DAILY
Qty: 180 TABLET | Refills: 3 | Status: SHIPPED | OUTPATIENT
Start: 2025-06-02

## 2025-06-16 DIAGNOSIS — I15.9 SECONDARY HYPERTENSION: ICD-10-CM

## 2025-06-16 RX ORDER — METOPROLOL TARTRATE 50 MG/1
50 TABLET ORAL 2 TIMES DAILY
Qty: 60 TABLET | Refills: 11 | Status: SHIPPED | OUTPATIENT
Start: 2025-06-16

## 2025-07-08 DIAGNOSIS — F51.01 PRIMARY INSOMNIA: ICD-10-CM

## 2025-07-09 RX ORDER — ZOLPIDEM TARTRATE 5 MG/1
5 TABLET ORAL NIGHTLY
Qty: 90 TABLET | Refills: 0 | Status: SHIPPED | OUTPATIENT
Start: 2025-07-09

## 2025-07-31 DIAGNOSIS — E78.49 OTHER HYPERLIPIDEMIA: ICD-10-CM

## 2025-08-01 RX ORDER — ATORVASTATIN CALCIUM 20 MG/1
20 TABLET, FILM COATED ORAL DAILY
Qty: 90 TABLET | Refills: 3 | Status: SHIPPED | OUTPATIENT
Start: 2025-08-01

## 2025-08-14 DIAGNOSIS — I25.10 CORONARY ARTERY DISEASE INVOLVING NATIVE HEART, UNSPECIFIED VESSEL OR LESION TYPE, UNSPECIFIED WHETHER ANGINA PRESENT: ICD-10-CM

## 2025-08-14 RX ORDER — CLOPIDOGREL BISULFATE 75 MG/1
75 TABLET ORAL DAILY
Qty: 90 TABLET | Refills: 3 | Status: SHIPPED | OUTPATIENT
Start: 2025-08-14

## 2025-10-23 ENCOUNTER — APPOINTMENT (OUTPATIENT)
Dept: PRIMARY CARE | Facility: CLINIC | Age: OVER 89
End: 2025-10-23
Payer: MEDICARE